# Patient Record
Sex: FEMALE | Race: BLACK OR AFRICAN AMERICAN | Employment: UNEMPLOYED | URBAN - METROPOLITAN AREA
[De-identification: names, ages, dates, MRNs, and addresses within clinical notes are randomized per-mention and may not be internally consistent; named-entity substitution may affect disease eponyms.]

---

## 2017-01-09 ENCOUNTER — APPOINTMENT (OUTPATIENT)
Dept: ULTRASOUND IMAGING | Age: 22
End: 2017-01-09
Attending: PHYSICIAN ASSISTANT
Payer: MEDICAID

## 2017-01-09 ENCOUNTER — HOSPITAL ENCOUNTER (EMERGENCY)
Age: 22
Discharge: HOME | End: 2017-01-10
Attending: EMERGENCY MEDICINE
Payer: MEDICAID

## 2017-01-09 DIAGNOSIS — N30.00 ACUTE CYSTITIS WITHOUT HEMATURIA: ICD-10-CM

## 2017-01-09 DIAGNOSIS — O21.9 VOMITING DURING PREGNANCY: Primary | ICD-10-CM

## 2017-01-09 DIAGNOSIS — K80.20 GALLSTONES: ICD-10-CM

## 2017-01-09 DIAGNOSIS — E86.0 DEHYDRATION: ICD-10-CM

## 2017-01-09 LAB
ALBUMIN SERPL BCP-MCNC: 3.6 G/DL (ref 3.5–5)
ALBUMIN/GLOB SERPL: 0.9 {RATIO} (ref 1.1–2.2)
ALP SERPL-CCNC: 62 U/L (ref 45–117)
ALT SERPL-CCNC: 14 U/L (ref 12–78)
ANION GAP BLD CALC-SCNC: 9 MMOL/L (ref 5–15)
APPEARANCE UR: ABNORMAL
AST SERPL W P-5'-P-CCNC: 8 U/L (ref 15–37)
BACTERIA URNS QL MICRO: ABNORMAL /HPF
BASOPHILS # BLD AUTO: 0 K/UL (ref 0–0.1)
BASOPHILS # BLD: 0 % (ref 0–1)
BILIRUB SERPL-MCNC: 0.4 MG/DL (ref 0.2–1)
BILIRUB UR QL CFM: NEGATIVE
BUN SERPL-MCNC: 6 MG/DL (ref 6–20)
BUN/CREAT SERPL: 7 (ref 12–20)
CALCIUM SERPL-MCNC: 9.4 MG/DL (ref 8.5–10.1)
CHLORIDE SERPL-SCNC: 100 MMOL/L (ref 97–108)
CO2 SERPL-SCNC: 28 MMOL/L (ref 21–32)
COLOR UR: ABNORMAL
CREAT SERPL-MCNC: 0.88 MG/DL (ref 0.55–1.02)
EOSINOPHIL # BLD: 0.1 K/UL (ref 0–0.4)
EOSINOPHIL NFR BLD: 1 % (ref 0–7)
EPITH CASTS URNS QL MICRO: ABNORMAL /LPF
ERYTHROCYTE [DISTWIDTH] IN BLOOD BY AUTOMATED COUNT: 13.8 % (ref 11.5–14.5)
GLOBULIN SER CALC-MCNC: 4.1 G/DL (ref 2–4)
GLUCOSE SERPL-MCNC: 79 MG/DL (ref 65–100)
GLUCOSE UR STRIP.AUTO-MCNC: NEGATIVE MG/DL
HCT VFR BLD AUTO: 39.4 % (ref 35–47)
HGB BLD-MCNC: 13.4 G/DL (ref 11.5–16)
HGB UR QL STRIP: NEGATIVE
KETONES UR QL STRIP.AUTO: >80 MG/DL
LACTATE SERPL-SCNC: 1.1 MMOL/L (ref 0.4–2)
LEUKOCYTE ESTERASE UR QL STRIP.AUTO: ABNORMAL
LIPASE SERPL-CCNC: 145 U/L (ref 73–393)
LYMPHOCYTES # BLD AUTO: 18 % (ref 12–49)
LYMPHOCYTES # BLD: 2.2 K/UL (ref 0.8–3.5)
MCH RBC QN AUTO: 32.6 PG (ref 26–34)
MCHC RBC AUTO-ENTMCNC: 34 G/DL (ref 30–36.5)
MCV RBC AUTO: 95.9 FL (ref 80–99)
MONOCYTES # BLD: 0.7 K/UL (ref 0–1)
MONOCYTES NFR BLD AUTO: 6 % (ref 5–13)
MUCOUS THREADS URNS QL MICRO: ABNORMAL /LPF
NEUTS SEG # BLD: 9.5 K/UL (ref 1.8–8)
NEUTS SEG NFR BLD AUTO: 75 % (ref 32–75)
NITRITE UR QL STRIP.AUTO: NEGATIVE
PH UR STRIP: 7.5 [PH] (ref 5–8)
PLATELET # BLD AUTO: 266 K/UL (ref 150–400)
POTASSIUM SERPL-SCNC: 3.7 MMOL/L (ref 3.5–5.1)
PROT SERPL-MCNC: 7.7 G/DL (ref 6.4–8.2)
PROT UR STRIP-MCNC: 100 MG/DL
RBC # BLD AUTO: 4.11 M/UL (ref 3.8–5.2)
RBC #/AREA URNS HPF: ABNORMAL /HPF (ref 0–5)
SODIUM SERPL-SCNC: 137 MMOL/L (ref 136–145)
SP GR UR REFRACTOMETRY: >1.03 (ref 1–1.03)
UA: UC IF INDICATED,UAUC: ABNORMAL
UROBILINOGEN UR QL STRIP.AUTO: 1 EU/DL (ref 0.2–1)
WBC # BLD AUTO: 12.6 K/UL (ref 3.6–11)
WBC URNS QL MICRO: ABNORMAL /HPF (ref 0–4)

## 2017-01-09 PROCEDURE — 74011000258 HC RX REV CODE- 258: Performed by: PHYSICIAN ASSISTANT

## 2017-01-09 PROCEDURE — 96365 THER/PROPH/DIAG IV INF INIT: CPT

## 2017-01-09 PROCEDURE — 83690 ASSAY OF LIPASE: CPT | Performed by: PHYSICIAN ASSISTANT

## 2017-01-09 PROCEDURE — 81001 URINALYSIS AUTO W/SCOPE: CPT | Performed by: EMERGENCY MEDICINE

## 2017-01-09 PROCEDURE — 87077 CULTURE AEROBIC IDENTIFY: CPT | Performed by: EMERGENCY MEDICINE

## 2017-01-09 PROCEDURE — 96361 HYDRATE IV INFUSION ADD-ON: CPT

## 2017-01-09 PROCEDURE — 96375 TX/PRO/DX INJ NEW DRUG ADDON: CPT

## 2017-01-09 PROCEDURE — 74011250636 HC RX REV CODE- 250/636: Performed by: PHYSICIAN ASSISTANT

## 2017-01-09 PROCEDURE — 83605 ASSAY OF LACTIC ACID: CPT | Performed by: PHYSICIAN ASSISTANT

## 2017-01-09 PROCEDURE — 87086 URINE CULTURE/COLONY COUNT: CPT | Performed by: EMERGENCY MEDICINE

## 2017-01-09 PROCEDURE — 99284 EMERGENCY DEPT VISIT MOD MDM: CPT

## 2017-01-09 PROCEDURE — 36415 COLL VENOUS BLD VENIPUNCTURE: CPT | Performed by: EMERGENCY MEDICINE

## 2017-01-09 PROCEDURE — 80053 COMPREHEN METABOLIC PANEL: CPT | Performed by: EMERGENCY MEDICINE

## 2017-01-09 PROCEDURE — 76705 ECHO EXAM OF ABDOMEN: CPT

## 2017-01-09 PROCEDURE — 87186 SC STD MICRODIL/AGAR DIL: CPT | Performed by: EMERGENCY MEDICINE

## 2017-01-09 PROCEDURE — 85025 COMPLETE CBC W/AUTO DIFF WBC: CPT | Performed by: EMERGENCY MEDICINE

## 2017-01-09 RX ORDER — ACETAMINOPHEN 500 MG
1000 TABLET ORAL ONCE
Status: DISCONTINUED | OUTPATIENT
Start: 2017-01-09 | End: 2017-01-09

## 2017-01-09 RX ORDER — ONDANSETRON 2 MG/ML
4 INJECTION INTRAMUSCULAR; INTRAVENOUS
Status: COMPLETED | OUTPATIENT
Start: 2017-01-09 | End: 2017-01-09

## 2017-01-09 RX ORDER — CEPHALEXIN 500 MG/1
500 CAPSULE ORAL 3 TIMES DAILY
Qty: 21 CAP | Refills: 0 | Status: SHIPPED | OUTPATIENT
Start: 2017-01-09 | End: 2017-01-12

## 2017-01-09 RX ORDER — ONDANSETRON 4 MG/1
4 TABLET, ORALLY DISINTEGRATING ORAL
Qty: 15 TAB | Refills: 0 | Status: SHIPPED | OUTPATIENT
Start: 2017-01-09 | End: 2017-01-29

## 2017-01-09 RX ADMIN — CEFTRIAXONE 1 G: 1 INJECTION, POWDER, FOR SOLUTION INTRAMUSCULAR; INTRAVENOUS at 21:44

## 2017-01-09 RX ADMIN — SODIUM CHLORIDE 2000 ML: 900 INJECTION, SOLUTION INTRAVENOUS at 21:44

## 2017-01-09 RX ADMIN — ONDANSETRON 4 MG: 2 INJECTION INTRAMUSCULAR; INTRAVENOUS at 21:44

## 2017-01-10 VITALS
HEIGHT: 65 IN | OXYGEN SATURATION: 100 % | WEIGHT: 160.5 LBS | TEMPERATURE: 98.8 F | DIASTOLIC BLOOD PRESSURE: 77 MMHG | RESPIRATION RATE: 16 BRPM | HEART RATE: 110 BPM | BODY MASS INDEX: 26.74 KG/M2 | SYSTOLIC BLOOD PRESSURE: 113 MMHG

## 2017-01-10 NOTE — ED NOTES
Assumed care of pt from triage at this time. Pt arrives with c/o R flank pain intermittently x2 months. Pt states pain worsened today with persistent N/V that began last night. Pt denies any diarrhea-LBM this am, pt describes as \"normal, formed\" stool. Pt has not tolerated PO fluids, solids today. Pt denies any urinary symptoms. Pt states she has been seen for similar symptoms in the past.      Pt resting comfortably on the stretcher in a position of comfort.  Pt in no acute distress at this time.  Call bell within reach.  Side rails x 2.  Cardiac monitor x 2.  Stretcher locked in the lowest position.  Pt aware of plan to await for MD/PA-C/NP assessment, and pt/family verbalizes understanding.  Will continue to monitor abdominal pain.

## 2017-01-10 NOTE — ED PROVIDER NOTES
HPI Comments: Sharla Huntley is a 19 y.o. A0 female who is 16 weeks pregnant (determined from previous [address]) presents ambulatory to the ED c/o constant 7/10 RUQ abdominal pain since this AM.  She also c/o nausea and vomiting for 3 days, noting that N/V has been an ongoing issue since the start of her pregnancy. Pt states she has been seen multiple times in ED and admitted once for same symptoms during current pregnancy, and has been prescribed multiple antiemetics for her nausea but only took one of the antiemetics once today without any relief in nausea. She denies taking any medications for pain. Per medical records, pt was first seen in ED on 16 for nausea and vomiting and was discharged home with Zofran after being found pregnant. Pt states she followed up with an OBGYN who [place] [facility]  Records show that pt was seen in ED again on  and admitted to hospital until  for intractable nausea and vomiting with acute pancreatitis. She was discharged from hospital after symptomatic improvement, and prescribed home with Zofran and Reglan. Pt was see again in ED on  and  for N/V and hyperemesis gravidarum, and discharged home with Zofran ODT and phenergan. Per pt, last follow-up with OBGYN was in December, but pt is unsure of her gestational age. Of note, pt states she is not tolerating any PO intake or PO medications, including her prenatal vitamins. She notes her last BM was yesterday. She specifically denies fevers, chills, diarrhea, hematemesis, or vaginal discharge. PCP: None    Patient is a poor historian. The history is provided by the patient. Past Medical History:   Diagnosis Date    Congenital heart defect        History reviewed. No pertinent past surgical history.       Family History:   Problem Relation Age of Onset    No Known Problems Mother     No Known Problems Father     No Known Problems Sister     No Known Problems Brother  Diabetes Maternal Grandmother        Social History     Social History    Marital status: SINGLE     Spouse name: N/A    Number of children: N/A    Years of education: N/A     Occupational History    Not on file. Social History Main Topics    Smoking status: Former Smoker     Quit date: 11/16/2015    Smokeless tobacco: Not on file    Alcohol use No    Drug use: No    Sexual activity: Yes     Partners: Male     Other Topics Concern     Service No    Sleep Concern No    Weight Concern No    Seat Belt Yes     Social History Narrative         ALLERGIES: Review of patient's allergies indicates no known allergies. Review of Systems   Constitutional: Negative. Negative for activity change, chills, fatigue and unexpected weight change. Respiratory: Negative for cough, chest tightness, shortness of breath and wheezing. Cardiovascular: Negative. Negative for chest pain and palpitations. Gastrointestinal: Positive for abdominal pain (RUQ), nausea and vomiting. Negative for diarrhea. Denies hematemesis   Genitourinary: Negative for dysuria, flank pain, frequency, hematuria and vaginal discharge. Musculoskeletal: Negative. Negative for arthralgias, back pain, neck pain and neck stiffness. Skin: Negative. Negative for color change and rash. Neurological: Negative. Negative for dizziness, numbness and headaches. Psychiatric/Behavioral: Negative. Negative for confusion. All other systems reviewed and are negative. Vitals:    01/09/17 1808 01/09/17 1856 01/09/17 2023 01/09/17 2215   BP: 121/70 (!) 112/93 100/70 116/73   Pulse: (!) 110 (!) 127 (!) 110    Resp: 18 18 16    Temp: 98.8 °F (37.1 °C)      SpO2: 100% 98% 100% 98%   Weight: 72.8 kg (160 lb 7.9 oz)      Height: 5' 5\" (1.651 m)               Physical Exam   Constitutional: She is oriented to person, place, and time. She appears well-developed and well-nourished. She is active. Non-toxic appearance.  No distress. HENT:   Head: Normocephalic and atraumatic. Eyes: Conjunctivae are normal. Pupils are equal, round, and reactive to light. Right eye exhibits no discharge. Left eye exhibits no discharge. Neck: Normal range of motion and full passive range of motion without pain. Neck supple. No tracheal tenderness present. Cardiovascular: Normal rate, regular rhythm, normal heart sounds, intact distal pulses and normal pulses. Exam reveals no gallop and no friction rub. No murmur heard. Pulmonary/Chest: Effort normal and breath sounds normal. No respiratory distress. She has no wheezes. She has no rales. She exhibits no tenderness. Abdominal: Soft. Normal appearance and bowel sounds are normal. She exhibits no distension. There is tenderness (mild mid R quadrant TTP). There is no rebound, no guarding and no CVA tenderness. Musculoskeletal: Normal range of motion. She exhibits no edema or tenderness. Neurological: She is alert and oriented to person, place, and time. She has normal strength. No cranial nerve deficit or sensory deficit. Coordination normal.   Skin: Skin is warm, dry and intact. No abrasion and no rash noted. She is not diaphoretic. No erythema. Psychiatric: She has a normal mood and affect. Her speech is normal and behavior is normal. Cognition and memory are normal.   Nursing note and vitals reviewed. MDM  Number of Diagnoses or Management Options  Diagnosis management comments: DDx: UTI, pyelonephritis, cholecystitis, pancreatitis, dehydration, electrolyte abnormality, hyperemesis gravidarum       Amount and/or Complexity of Data Reviewed  Clinical lab tests: ordered and reviewed  Tests in the radiology section of CPT®: ordered and reviewed  Review and summarize past medical records: yes    Patient Progress  Patient progress: resolved    ED Course       Procedures    PROGRESS NOTE:  11:23 PM  Pt re-evaluated.   Reports her pain has resolved and does not want any pain medications, acetaminophen had been ordered. Nausea is resolved and she is drinking water, on her 2nd bag of normal saline. No longer tachycardic, states she feels better. Pt now states she took 1 Reglan pill yesterday, and took 1 phenergan suppository today, but is out of her Zofran ODT. Discussed to take her anti-emetics and to take more than one if needed for nausea. Written by Savanah Gonzalez ED Scribe, as dictated by Tho Veliz. 11:25 PM  I reviewed our electronic medical record system for any past medical records that were available that may contribute to the patients current condition. Most recent urine cx grew e.coli sensitive to cephalosporins. Rocephin given in ED and Keflex at discharge. Current cx sent. Per nurse fetal heart tones were done in triage and were found to be 160. Sherwin Mirza PA-C        LABORATORY TESTS:  Recent Results (from the past 12 hour(s))   CBC WITH AUTOMATED DIFF    Collection Time: 01/09/17  7:00 PM   Result Value Ref Range    WBC 12.6 (H) 3.6 - 11.0 K/uL    RBC 4.11 3.80 - 5.20 M/uL    HGB 13.4 11.5 - 16.0 g/dL    HCT 39.4 35.0 - 47.0 %    MCV 95.9 80.0 - 99.0 FL    MCH 32.6 26.0 - 34.0 PG    MCHC 34.0 30.0 - 36.5 g/dL    RDW 13.8 11.5 - 14.5 %    PLATELET 875 792 - 742 K/uL    NEUTROPHILS 75 32 - 75 %    LYMPHOCYTES 18 12 - 49 %    MONOCYTES 6 5 - 13 %    EOSINOPHILS 1 0 - 7 %    BASOPHILS 0 0 - 1 %    ABS. NEUTROPHILS 9.5 (H) 1.8 - 8.0 K/UL    ABS. LYMPHOCYTES 2.2 0.8 - 3.5 K/UL    ABS. MONOCYTES 0.7 0.0 - 1.0 K/UL    ABS. EOSINOPHILS 0.1 0.0 - 0.4 K/UL    ABS.  BASOPHILS 0.0 0.0 - 0.1 K/UL   METABOLIC PANEL, COMPREHENSIVE    Collection Time: 01/09/17  7:00 PM   Result Value Ref Range    Sodium 137 136 - 145 mmol/L    Potassium 3.7 3.5 - 5.1 mmol/L    Chloride 100 97 - 108 mmol/L    CO2 28 21 - 32 mmol/L    Anion gap 9 5 - 15 mmol/L    Glucose 79 65 - 100 mg/dL    BUN 6 6 - 20 MG/DL    Creatinine 0.88 0.55 - 1.02 MG/DL    BUN/Creatinine ratio 7 (L) 12 - 20 GFR est AA >60 >60 ml/min/1.73m2    GFR est non-AA >60 >60 ml/min/1.73m2    Calcium 9.4 8.5 - 10.1 MG/DL    Bilirubin, total 0.4 0.2 - 1.0 MG/DL    ALT 14 12 - 78 U/L    AST 8 (L) 15 - 37 U/L    Alk. phosphatase 62 45 - 117 U/L    Protein, total 7.7 6.4 - 8.2 g/dL    Albumin 3.6 3.5 - 5.0 g/dL    Globulin 4.1 (H) 2.0 - 4.0 g/dL    A-G Ratio 0.9 (L) 1.1 - 2.2     LIPASE    Collection Time: 01/09/17  7:00 PM   Result Value Ref Range    Lipase 145 73 - 393 U/L   URINALYSIS W/ REFLEX CULTURE    Collection Time: 01/09/17  8:29 PM   Result Value Ref Range    Color DARK YELLOW      Appearance CLOUDY (A) CLEAR      Specific gravity >1.030 (H) 1.003 - 1.030    pH (UA) 7.5 5.0 - 8.0      Protein 100 (A) NEG mg/dL    Glucose NEGATIVE  NEG mg/dL    Ketone >80 (A) NEG mg/dL    Blood NEGATIVE  NEG      Urobilinogen 1.0 0.2 - 1.0 EU/dL    Nitrites NEGATIVE  NEG      Leukocyte Esterase SMALL (A) NEG      WBC 0-4 0 - 4 /hpf    RBC 0-5 0 - 5 /hpf    Epithelial cells MANY (A) FEW /lpf    Bacteria 3+ (A) NEG /hpf    UA:UC IF INDICATED URINE CULTURE ORDERED (A) CNI      Mucus 2+ (A) NEG /lpf   BILIRUBIN, CONFIRM    Collection Time: 01/09/17  8:29 PM   Result Value Ref Range    Bilirubin UA, confirm NEGATIVE  NEG     LACTIC ACID, PLASMA    Collection Time: 01/09/17  9:35 PM   Result Value Ref Range    Lactic acid 1.1 0.4 - 2.0 MMOL/L       IMAGING RESULTS:  US ABD LTD   Final Result   INDICATION: ruq pain today; eval gallbladder, pancreas, eval right kidney for  hydro      EXAM: Limited right upper quadrant abdominal ultrasound. Comparison November 25, 2016.      FINDINGS: There are no focal hepatic lesions. No intra or extrahepatic biliary  ductal dilatation. Common duct measures 3 mm. There are tiny mobile stones  within the gallbladder. No wall thickening. Pancreatic head is not enlarged. The  right kidney measures 10.1cm. No stones or hydronephrosis.  Portal vein is patent  with appropriate direction of flow.     IMPRESSION  IMPRESSION:  1. Small gallstones. No ductal dilatation          MEDICATIONS GIVEN:  Medications   sodium chloride 0.9 % bolus infusion 2,000 mL (2,000 mL IntraVENous New Bag 1/9/17 2144)   ondansetron (ZOFRAN) injection 4 mg (4 mg IntraVENous Given 1/9/17 2144)   cefTRIAXone (ROCEPHIN) 1 g in 0.9% sodium chloride (MBP/ADV) 50 mL (0 g IntraVENous IV Completed 1/9/17 2214)       IMPRESSION:  1. Vomiting during pregnancy    2. Dehydration    3. Acute cystitis without hematuria    4. Gallstones        PLAN:  1. Discharge home  Current Discharge Medication List      START taking these medications    Details   !! ondansetron (ZOFRAN ODT) 4 mg disintegrating tablet Take 1 Tab by mouth every eight (8) hours as needed for Nausea. Qty: 15 Tab, Refills: 0       !! - Potential duplicate medications found. Please discuss with provider. CONTINUE these medications which have NOT CHANGED    Details   !! ondansetron (ZOFRAN ODT) 4 mg disintegrating tablet Take 1 Tab by mouth every eight (8) hours as needed for Nausea for up to 10 doses. Qty: 10 Tab, Refills: 0      promethazine (PHENERGAN) 25 mg tablet Take 1 Tab by mouth every six (6) hours as needed for Nausea for up to 12 doses. Qty: 12 Tab, Refills: 0      prenatal multivit-ca-min-fe-fa tab Take 1 UNSPECIFIED by mouth daily. Indications: Pregnancy      !! ondansetron (ZOFRAN ODT) 4 mg disintegrating tablet Take 1 Tab by mouth every eight (8) hours as needed for Nausea. Qty: 16 Tab, Refills: 0      metoclopramide HCl (REGLAN) 5 mg tablet Take 1 Tab by mouth every eight (8) hours as needed. For nausea  Qty: 30 Tab, Refills: 0       !! - Potential duplicate medications found. Please discuss with provider. 2.   Follow-up Information     Follow up With Details Comments Contact Info    Your OB at [facility] Schedule an appointment as soon as possible for a visit in 1-2 days for re-check. Continue phenergan suppositories, Reglan, Zofran as needed.      [facility] EMERGENCY DEPT  If symptoms worsen or [facility] emergency room. [address] [place] [address]  [phone]      Return to ED if worse     DISCHARGE NOTE  11:38 PM  The patient has been re-evaluated and is ready for discharge. Reviewed available results with patient. Counseled pt on diagnosis and care plan. Pt has expressed understanding, and all questions have been answered. Pt agrees with plan and agrees to F/U as recommended, or return to the ED if their sxs worsen. Discharge instructions have been provided and explained to the pt, along with reasons to return to the ED. This note is prepared by Lisa Glaser, acting as Scribe for [employer]. DEONTE Guillory: The scribe's documentation has been prepared under my direction and personally reviewed by me in its entirety. I confirm that the note above accurately reflects all work, treatment, procedures, and medical decision making performed by me.

## 2017-01-10 NOTE — DISCHARGE INSTRUCTIONS
Dehydration: Care Instructions  Your Care Instructions  Dehydration happens when your body loses too much fluid. This might happen when you do not drink enough water or you lose large amounts of fluids from your body because of diarrhea, vomiting, or sweating. Severe dehydration can be life-threatening. Water and minerals called electrolytes help put your body fluids back in balance. Learn the early signs of fluid loss, and drink more fluids to prevent dehydration. Follow-up care is a key part of your treatment and safety. Be sure to make and go to all appointments, and call your doctor if you are having problems. It's also a good idea to know your test results and keep a list of the medicines you take. How can you care for yourself at home? · To prevent dehydration, drink plenty of fluids, enough so that your urine is light yellow or clear like water. Choose water and other caffeine-free clear liquids until you feel better. If you have kidney, heart, or liver disease and have to limit fluids, talk with your doctor before you increase the amount of fluids you drink. · If you do not feel like eating or drinking, try taking small sips of water, sports drinks, or other rehydration drinks. · Get plenty of rest.  To prevent dehydration  · Add more fluids to your diet and daily routine, unless your doctor has told you not to. · During hot weather, drink more fluids. Drink even more fluids if you exercise a lot. Stay away from drinks with alcohol or caffeine. · Watch for the symptoms of dehydration. These include:  ¨ A dry, sticky mouth. ¨ Dark yellow urine, and not much of it. ¨ Dry and sunken eyes. ¨ Feeling very tired. · Learn what problems can lead to dehydration. These include:  ¨ Diarrhea, fever, and vomiting. ¨ Any illness with a fever, such as pneumonia or the flu. ¨ Activities that cause heavy sweating, such as endurance races and heavy outdoor work in hot or humid weather.   ¨ Alcohol or drug abuse or withdrawal.  ¨ Certain medicines, such as cold and allergy pills (antihistamines), diet pills (diuretics), and laxatives. ¨ Certain diseases, such as diabetes, cancer, and heart or kidney disease. When should you call for help? Call 911 anytime you think you may need emergency care. For example, call if:  · You passed out (lost consciousness). Call your doctor now or seek immediate medical care if:  · You are confused and cannot think clearly. · You are dizzy or lightheaded, or you feel like you may faint. · You have signs of needing more fluids. You have sunken eyes and a dry mouth, and you pass only a little dark urine. · You cannot keep fluids down. Watch closely for changes in your health, and be sure to contact your doctor if:  · You are not making tears. · Your skin is very dry and sags slowly back into place after you pinch it. · Your mouth and eyes are very dry. Where can you learn more? Go to http://gemma-humberto.info/. Enter T025 in the search box to learn more about \"Dehydration: Care Instructions. \"  Current as of: May 27, 2016  Content Version: 11.1  © 4108-5340 [employer]. Care instructions adapted under license by [employer] (which disclaims liability or warranty for this information). If you have questions about a medical condition or this instruction, always ask your healthcare professional. [place] [address] any warranty or liability for your use of this information. Oral Rehydration: Care Instructions  Your Care Instructions  Dehydration occurs when your body loses too much water. This can happen if you do not drink enough fluids or lose a lot of fluid due to diarrhea, vomiting, or sweating. Being dehydrated can cause health problems and can even be life-threatening. To replace lost fluids, you need to drink liquid that contains special chemicals called electrolytes. Electrolytes keep your body working well. Plain water does not have electrolytes. You also need to rest to prevent more fluid loss. Replacing water and electrolytes (oral rehydration) completely takes about 36 hours. But you should feel better within a few hours. Follow-up care is a key part of your treatment and safety. Be sure to make and go to all appointments, and call your doctor if you are having problems. It's also a good idea to know your test results and keep a list of the medicines you take. How can you care for yourself at home? · Take frequent sips of a drink such as Gatorade, Powerade, or other rehydration drinks that your doctor suggests. These replace both fluid and important chemicals (electrolytes) you need for balance in your blood. · Drink 2 quarts of cool liquid over 2 to 4 hours. You should have at least 10 glasses of liquid a day to replace lost fluid. If you have kidney, heart, or liver disease and have to limit fluids, talk with your doctor before you increase the amount of fluids you drink. · Make your own drink. Measure everything carefully. The drink may not work well or may even be harmful if the amounts are off. ¨ 1 quart water  ¨ ½ teaspoon salt  ¨ 6 teaspoons sugar  · Do not drink liquid with caffeine, such as coffee and ike. · Do not drink any alcohol. It can make you dehydrated. · Drink plenty of fluids, enough so that your urine is light yellow or clear like water. If you have kidney, heart, or liver disease and have to limit fluids, talk with your doctor before you increase the amount of fluids you drink. When should you call for help? Call 911 anytime you think you may need emergency care. For example, call if:  · You have signs of severe dehydration, such as:  ¨ You are confused or unable to stay awake. ¨ You passed out (lost consciousness). Call your doctor now or seek immediate medical care if:  · You still have signs of dehydration.  You have sunken eyes and a dry mouth, and you pass only a little dark urine.  · You are dizzy or lightheaded, or you feel like you may faint. · You are not able to keep down fluids. Watch closely for changes in your health, and be sure to contact your doctor if:  · You do not get better as expected. Where can you learn more? Go to http://gemma-humberto.info/. Enter I040 in the search box to learn more about \"Oral Rehydration: Care Instructions. \"  Current as of: May 27, 2016  Content Version: 11.1  © 3631-4016 [employer]. Care instructions adapted under license by [employer] (which disclaims liability or warranty for this information). If you have questions about a medical condition or this instruction, always ask your healthcare professional. [address] any warranty or liability for your use of this information. Urinary Tract Infection in Pregnancy: Care Instructions  Your Care Instructions    A urinary tract infection, or UTI, is an infection of the bladder and other urinary structures. Most UTIs occur in the bladder. They often cause pain or burning when you urinate. UTI is the most common bacterial infection in pregnancy. If untreated, a UTI could lead to problems such as a kidney infection or  labor. Most UTIs can be cured with antibiotics. Your doctor will prescribe an antibiotic that is safe during pregnancy. Be sure to finish your medicine so that the infection does not spread to your kidneys. Follow-up care is a key part of your treatment and safety. Be sure to make and go to all appointments, and call your doctor if you are having problems. It's also a good idea to know your test results and keep a list of the medicines you take. How can you care for yourself at home? · Take your antibiotics as directed. Do not stop taking them just because you feel better. You need to take the full course of antibiotics. · Drink extra water and other fluids for the next day or two.  This will help wash out the bacteria causing the infection. If you have kidney, heart, or liver disease and have to limit fluids, talk with your doctor before you increase the amount of fluids you drink. · Drink cranberry juice to help fight bacteria in the bladder. · Do not drink alcohol. · Urinate often. Try to empty your bladder each time. Preventing UTIs  · Drink plenty of fluids, enough so that your urine is light yellow or clear like water. This helps you urinate often, which clears bacteria from your system. If you have kidney, heart, or liver disease and have to limit fluids, talk with your doctor before you increase the amount of fluids you drink. · Drink cranberry juice. · Urinate when you first have the urge. · Urinate right after you have sex. This is the best way for women to avoid UTIs. · When going to the bathroom, wipe from front to back to keep bacteria from entering the vagina or urethra. When should you call for help? Call your doctor now or seek immediate medical care if:  · Symptoms such as fever, chills, nausea, or vomiting get worse or appear for the first time. · You have new pain in your back just below your rib cage. This is called flank pain. · There is new blood or pus in your urine. · You have any problems with your antibiotic medicine. Watch closely for changes in your health, and be sure to contact your doctor if:  · You are not getting better after 1 day (24 hours). · You have new symptoms, such as blood in your urine. Where can you learn more? Go to http://gemma-humberto.info/. Enter M982 in the search box to learn more about \"Urinary Tract Infection in Pregnancy: Care Instructions. \"  Current as of: June 8, 2016  Content Version: 11.1  © 8654-2500 [employer]. Care instructions adapted under license by [employer] (which disclaims liability or warranty for this information).  If you have questions about a medical condition or this instruction, always ask your healthcare professional. [place] [address] any warranty or liability for your use of this information.

## 2017-01-10 NOTE — ED NOTES
Discharge instructions given to patient by MARLENY Cooper. Verbalized understanding of instructions. Patient discharged without difficulty. Patient discharged in stable condition via ambulation accompanied by family.

## 2017-01-12 LAB
BACTERIA SPEC CULT: NORMAL
CC UR VC: NORMAL
SERVICE CMNT-IMP: NORMAL

## 2017-01-12 RX ORDER — AMOXICILLIN AND CLAVULANATE POTASSIUM 875; 125 MG/1; MG/1
1 TABLET, FILM COATED ORAL 2 TIMES DAILY
Qty: 10 TAB | Refills: 0 | Status: SHIPPED | OUTPATIENT
Start: 2017-01-12 | End: 2017-01-17

## 2017-01-12 NOTE — PROGRESS NOTES
Pt called back and discussed her urine culture results with her. Pt states she is feeling better and that she is currently asymptomatic. Pt has no further questions. Sent script to preferred pharmacy.   Marcela Montalvo PA-C

## 2017-01-12 NOTE — PROGRESS NOTES
Attempted to call pt at number provided. Pt was unable to answer phone so I left a VM without revealing protected health information. Asked pt to call PA phone back to discuss results. Pt will need Augmentin or Amoxil course of ABX per Bessie Snowden, PharmD.    JUAN JOSE AyalaC

## 2017-01-29 ENCOUNTER — HOSPITAL ENCOUNTER (EMERGENCY)
Age: 22
Discharge: HOME | End: 2017-01-29
Attending: EMERGENCY MEDICINE
Payer: MEDICAID

## 2017-01-29 VITALS
DIASTOLIC BLOOD PRESSURE: 73 MMHG | HEIGHT: 65 IN | SYSTOLIC BLOOD PRESSURE: 116 MMHG | BODY MASS INDEX: 27.14 KG/M2 | OXYGEN SATURATION: 100 % | TEMPERATURE: 98.2 F | RESPIRATION RATE: 16 BRPM | HEART RATE: 92 BPM | WEIGHT: 162.92 LBS

## 2017-01-29 DIAGNOSIS — N30.00 ACUTE CYSTITIS WITHOUT HEMATURIA: ICD-10-CM

## 2017-01-29 DIAGNOSIS — O21.0 MORNING SICKNESS: Primary | ICD-10-CM

## 2017-01-29 LAB
ALBUMIN SERPL BCP-MCNC: 3.4 G/DL (ref 3.5–5)
ALBUMIN/GLOB SERPL: 0.9 {RATIO} (ref 1.1–2.2)
ALP SERPL-CCNC: 65 U/L (ref 45–117)
ALT SERPL-CCNC: 15 U/L (ref 12–78)
ANION GAP BLD CALC-SCNC: 11 MMOL/L (ref 5–15)
APPEARANCE UR: ABNORMAL
AST SERPL W P-5'-P-CCNC: 11 U/L (ref 15–37)
BACTERIA URNS QL MICRO: ABNORMAL /HPF
BASOPHILS # BLD AUTO: 0 K/UL (ref 0–0.1)
BASOPHILS # BLD: 0 % (ref 0–1)
BILIRUB SERPL-MCNC: 0.4 MG/DL (ref 0.2–1)
BILIRUB UR QL CFM: NEGATIVE
BUN SERPL-MCNC: 6 MG/DL (ref 6–20)
BUN/CREAT SERPL: 7 (ref 12–20)
CALCIUM SERPL-MCNC: 9.4 MG/DL (ref 8.5–10.1)
CHLORIDE SERPL-SCNC: 103 MMOL/L (ref 97–108)
CO2 SERPL-SCNC: 27 MMOL/L (ref 21–32)
COLOR UR: ABNORMAL
CREAT SERPL-MCNC: 0.84 MG/DL (ref 0.55–1.02)
EOSINOPHIL # BLD: 0.2 K/UL (ref 0–0.4)
EOSINOPHIL NFR BLD: 2 % (ref 0–7)
EPITH CASTS URNS QL MICRO: ABNORMAL /LPF
ERYTHROCYTE [DISTWIDTH] IN BLOOD BY AUTOMATED COUNT: 13.4 % (ref 11.5–14.5)
GLOBULIN SER CALC-MCNC: 4 G/DL (ref 2–4)
GLUCOSE SERPL-MCNC: 77 MG/DL (ref 65–100)
GLUCOSE UR STRIP.AUTO-MCNC: NEGATIVE MG/DL
HCT VFR BLD AUTO: 38.7 % (ref 35–47)
HGB BLD-MCNC: 13 G/DL (ref 11.5–16)
HGB UR QL STRIP: NEGATIVE
KETONES UR QL STRIP.AUTO: ABNORMAL MG/DL
LEUKOCYTE ESTERASE UR QL STRIP.AUTO: ABNORMAL
LYMPHOCYTES # BLD AUTO: 20 % (ref 12–49)
LYMPHOCYTES # BLD: 1.9 K/UL (ref 0.8–3.5)
MCH RBC QN AUTO: 33 PG (ref 26–34)
MCHC RBC AUTO-ENTMCNC: 33.6 G/DL (ref 30–36.5)
MCV RBC AUTO: 98.2 FL (ref 80–99)
MONOCYTES # BLD: 0.8 K/UL (ref 0–1)
MONOCYTES NFR BLD AUTO: 8 % (ref 5–13)
NEUTS SEG # BLD: 6.7 K/UL (ref 1.8–8)
NEUTS SEG NFR BLD AUTO: 70 % (ref 32–75)
NITRITE UR QL STRIP.AUTO: NEGATIVE
PH UR STRIP: 7 [PH] (ref 5–8)
PLATELET # BLD AUTO: 251 K/UL (ref 150–400)
POTASSIUM SERPL-SCNC: 3.5 MMOL/L (ref 3.5–5.1)
PROT SERPL-MCNC: 7.4 G/DL (ref 6.4–8.2)
PROT UR STRIP-MCNC: 100 MG/DL
RBC # BLD AUTO: 3.94 M/UL (ref 3.8–5.2)
RBC #/AREA URNS HPF: ABNORMAL /HPF (ref 0–5)
SODIUM SERPL-SCNC: 141 MMOL/L (ref 136–145)
SP GR UR REFRACTOMETRY: 1.02 (ref 1–1.03)
UA: UC IF INDICATED,UAUC: ABNORMAL
UROBILINOGEN UR QL STRIP.AUTO: 1 EU/DL (ref 0.2–1)
WBC # BLD AUTO: 9.6 K/UL (ref 3.6–11)
WBC URNS QL MICRO: ABNORMAL /HPF (ref 0–4)

## 2017-01-29 PROCEDURE — 36415 COLL VENOUS BLD VENIPUNCTURE: CPT | Performed by: EMERGENCY MEDICINE

## 2017-01-29 PROCEDURE — 80053 COMPREHEN METABOLIC PANEL: CPT | Performed by: EMERGENCY MEDICINE

## 2017-01-29 PROCEDURE — 96374 THER/PROPH/DIAG INJ IV PUSH: CPT

## 2017-01-29 PROCEDURE — 74011250637 HC RX REV CODE- 250/637: Performed by: EMERGENCY MEDICINE

## 2017-01-29 PROCEDURE — 74011250636 HC RX REV CODE- 250/636: Performed by: EMERGENCY MEDICINE

## 2017-01-29 PROCEDURE — 99283 EMERGENCY DEPT VISIT LOW MDM: CPT

## 2017-01-29 PROCEDURE — 85025 COMPLETE CBC W/AUTO DIFF WBC: CPT | Performed by: EMERGENCY MEDICINE

## 2017-01-29 PROCEDURE — 81001 URINALYSIS AUTO W/SCOPE: CPT | Performed by: EMERGENCY MEDICINE

## 2017-01-29 PROCEDURE — 96361 HYDRATE IV INFUSION ADD-ON: CPT

## 2017-01-29 PROCEDURE — 87086 URINE CULTURE/COLONY COUNT: CPT | Performed by: EMERGENCY MEDICINE

## 2017-01-29 RX ORDER — CEPHALEXIN 500 MG/1
500 CAPSULE ORAL 4 TIMES DAILY
Qty: 28 CAP | Refills: 0 | Status: SHIPPED | OUTPATIENT
Start: 2017-01-29 | End: 2017-02-05

## 2017-01-29 RX ORDER — CEPHALEXIN 250 MG/1
500 CAPSULE ORAL
Status: COMPLETED | OUTPATIENT
Start: 2017-01-29 | End: 2017-01-29

## 2017-01-29 RX ORDER — ONDANSETRON 2 MG/ML
4 INJECTION INTRAMUSCULAR; INTRAVENOUS
Status: COMPLETED | OUTPATIENT
Start: 2017-01-29 | End: 2017-01-29

## 2017-01-29 RX ORDER — ONDANSETRON 4 MG/1
4 TABLET, ORALLY DISINTEGRATING ORAL
Qty: 20 TAB | Refills: 0 | Status: SHIPPED | OUTPATIENT
Start: 2017-01-29 | End: 2019-05-17

## 2017-01-29 RX ORDER — ONDANSETRON 4 MG/1
4 TABLET, FILM COATED ORAL
Qty: 20 TAB | Refills: 0 | Status: SHIPPED | OUTPATIENT
Start: 2017-01-29 | End: 2017-01-29

## 2017-01-29 RX ADMIN — SODIUM CHLORIDE 1000 ML: 900 INJECTION, SOLUTION INTRAVENOUS at 11:54

## 2017-01-29 RX ADMIN — CEPHALEXIN 500 MG: 250 CAPSULE ORAL at 12:16

## 2017-01-29 RX ADMIN — ONDANSETRON 4 MG: 2 INJECTION INTRAMUSCULAR; INTRAVENOUS at 11:54

## 2017-01-29 NOTE — DISCHARGE INSTRUCTIONS
Managing Morning Sickness: Care Instructions  Your Care Instructions  For many women, the toughest part of early pregnancy is morning sickness. Morning sickness can range from mild nausea to severe nausea with bouts of vomiting. Symptoms may be worse in the morning, although they can strike at any time of the day or night. If you have nausea, vomiting, or both, look for safe measures that can bring you relief. You can take simple steps at home to manage morning sickness. These steps include changing what and when you eat and avoiding certain foods and smells. Some women find that acupuncture and acupressure wristbands also help. Follow-up care is a key part of your treatment and safety. Be sure to make and go to all appointments, and call your doctor if you are having problems. It's also a good idea to know your test results and keep a list of the medicines you take. How can you care for yourself at home? · Keep food in your stomach, but not too much at once. Your nausea may be worse if your stomach is empty. Eat five or six small meals a day instead of three large meals. · For morning nausea, eat a small snack, such as a couple of crackers or dry biscuits, before rising. Allow a few minutes for your stomach to settle before you get out of bed slowly. · Drink plenty of fluids, enough so that your urine is light yellow or clear like water. If you have kidney, heart, or liver disease and have to limit fluids, talk with your doctor before you increase the amount of fluids you drink. Some women find that peppermint tea helps with nausea. · Eat more protein, such as chicken, fish, lean meat, beans, nuts, and seeds. · Eat carbohydrate foods, such as potatoes, whole-grain cereals, rice, and pasta. · Avoid smells and foods that make you feel nauseated. Spicy or high-fat foods, citrus juice, milk, coffee, and tea with caffeine often make nausea worse. · Do not drink alcohol. · Do not smoke.  Try not to be around others who smoke. If you need help quitting, talk to your doctor about stop-smoking programs and medicines. These can increase your chances of quitting for good. · If you are taking iron supplements, ask your doctor if they are necessary. Iron can make nausea worse. · Get lots of rest. Stress and fatigue can make your morning sickness worse. · Ask your doctor about taking prescription medicine, or over-the-counter products such as vitamin B6, doxylamine, or lilliam, to relieve your symptoms. Your doctor can tell you the doses that are safe for you. · Take your prenatal vitamins at night on a full stomach. When should you call for help? Call your doctor now or seek immediate medical care if:  · You are too sick to your stomach to drink any fluids. · You have symptoms of dehydration, such as:  ¨ Dry eyes and a dry mouth. ¨ Passing only a little dark urine. ¨ Feeling thirstier than usual.  · You have new symptoms such as diarrhea, fever, or belly pain. Watch closely for changes in your health, and be sure to contact your doctor if:  · You lose weight. · You have ongoing nausea and vomiting. Where can you learn more? Go to http://gemma-humberto.info/. Enter C636 in the search box to learn more about \"Managing Morning Sickness: Care Instructions. \"  Current as of: June 8, 2016  Content Version: 11.1  © 6953-8934 [employer]. Care instructions adapted under license by [employer] (which disclaims liability or warranty for this information). If you have questions about a medical condition or this instruction, always ask your healthcare professional. [place] [address] any warranty or liability for your use of this information.

## 2017-01-29 NOTE — ED PROVIDER NOTES
HPI Comments:   Edgar Chavez is a 19 y.o. female (A0) who is 20 weeks pregnant presenting to the ED with her fiance C/O nausea/vomiting which started 1 week ago. She has attempted to drink water, Gatorade, and juice but is unable to keep any fluids down. Pt has been seen at this facility 3x for the symptoms and had significant improvement with Zofran, but has since run out of the prescription. She has her next appointment with her OB/GYN tomorrow. Pt has a hx of morning sickness during her last pregnancy. Patient denies fever, diarrhea, constipation, lightheadedness, urinary symptoms, or any other symptoms or complaints. OB/GYN: [facility]    There are no other complaints, changes or physical findings at this time. Written by WING Kaur, as dictated by Maddie Chakraborty MD      The history is provided by the patient. No  was used. Past Medical History:   Diagnosis Date    Congenital heart defect        No past surgical history on file. Family History:   Problem Relation Age of Onset    No Known Problems Mother     No Known Problems Father     No Known Problems Sister     No Known Problems Brother     Diabetes Maternal Grandmother        Social History     Social History    Marital status: SINGLE     Spouse name: N/A    Number of children: N/A    Years of education: N/A     Occupational History    Not on file. Social History Main Topics    Smoking status: Former Smoker     Quit date: 2015    Smokeless tobacco: Not on file    Alcohol use No    Drug use: No    Sexual activity: Yes     Partners: Male     Other Topics Concern     Service No    Sleep Concern No    Weight Concern No    Seat Belt Yes     Social History Narrative         ALLERGIES: Review of patient's allergies indicates no known allergies. Review of Systems   Constitutional: Negative for chills and fever. Respiratory: Negative for shortness of breath.     Cardiovascular: Negative for chest pain. Gastrointestinal: Positive for nausea and vomiting. Negative for constipation and diarrhea. Genitourinary: Negative for difficulty urinating, dysuria, frequency and hematuria. Neurological: Negative for weakness, light-headedness and numbness. All other systems reviewed and are negative. Vitals:    01/29/17 1106   BP: 116/73   Pulse: 92   Resp: 16   Temp: 98.2 °F (36.8 °C)   SpO2: 100%   Weight: 73.9 kg (162 lb 14.7 oz)   Height: 5' 5\" (1.651 m)            Physical Exam   Constitutional: She is oriented to person, place, and time. She appears well-developed and well-nourished. HENT:   Head: Normocephalic and atraumatic. Mouth/Throat: Mucous membranes are dry. Eyes: Conjunctivae and EOM are normal.   Neck: Normal range of motion. Neck supple. Cardiovascular: Normal rate and regular rhythm. Pulmonary/Chest: Effort normal and breath sounds normal. No respiratory distress. Abdominal: Soft. She exhibits no distension. There is no tenderness. Gravid abd   Musculoskeletal: Normal range of motion. Neurological: She is alert and oriented to person, place, and time. Skin: Skin is warm and dry. Psychiatric: She has a normal mood and affect. Nursing note and vitals reviewed. MDM  Number of Diagnoses or Management Options  Acute cystitis without hematuria:   Morning sickness:   Diagnosis management comments: Patient presents with nausea and vomiting. Differential includes morning sickness, gastritis/GERD, pancreatitis cholelithiasis, cholecystitis, hepatitis, renal pathology, ACS, gastroenteritis, infection such as UTI/PNA. Will obtain labs and fluids. Likely all morning sickness.         Amount and/or Complexity of Data Reviewed  Clinical lab tests: ordered and reviewed  Review and summarize past medical records: yes    Patient Progress  Patient progress: stable    Procedures    LABORATORY TESTS:  Recent Results (from the past 12 hour(s))   URINALYSIS W/ REFLEX CULTURE    Collection Time: 01/29/17 11:12 AM   Result Value Ref Range    Color DARK YELLOW      Appearance TURBID (A) CLEAR      Specific gravity 1.020 1.003 - 1.030      pH (UA) 7.0 5.0 - 8.0      Protein 100 (A) NEG mg/dL    Glucose NEGATIVE  NEG mg/dL    Ketone TRACE (A) NEG mg/dL    Blood NEGATIVE  NEG      Urobilinogen 1.0 0.2 - 1.0 EU/dL    Nitrites NEGATIVE  NEG      Leukocyte Esterase LARGE (A) NEG      WBC 20-50 0 - 4 /hpf    RBC 0-5 0 - 5 /hpf    Epithelial cells MODERATE (A) FEW /lpf    Bacteria 1+ (A) NEG /hpf    UA:UC IF INDICATED URINE CULTURE ORDERED (A) CNI     BILIRUBIN, CONFIRM    Collection Time: 01/29/17 11:12 AM   Result Value Ref Range    Bilirubin UA, confirm NEGATIVE  NEG     CBC WITH AUTOMATED DIFF    Collection Time: 01/29/17 11:27 AM   Result Value Ref Range    WBC 9.6 3.6 - 11.0 K/uL    RBC 3.94 3.80 - 5.20 M/uL    HGB 13.0 11.5 - 16.0 g/dL    HCT 38.7 35.0 - 47.0 %    MCV 98.2 80.0 - 99.0 FL    MCH 33.0 26.0 - 34.0 PG    MCHC 33.6 30.0 - 36.5 g/dL    RDW 13.4 11.5 - 14.5 %    PLATELET 031 076 - 373 K/uL    NEUTROPHILS 70 32 - 75 %    LYMPHOCYTES 20 12 - 49 %    MONOCYTES 8 5 - 13 %    EOSINOPHILS 2 0 - 7 %    BASOPHILS 0 0 - 1 %    ABS. NEUTROPHILS 6.7 1.8 - 8.0 K/UL    ABS. LYMPHOCYTES 1.9 0.8 - 3.5 K/UL    ABS. MONOCYTES 0.8 0.0 - 1.0 K/UL    ABS. EOSINOPHILS 0.2 0.0 - 0.4 K/UL    ABS. BASOPHILS 0.0 0.0 - 0.1 K/UL   METABOLIC PANEL, COMPREHENSIVE    Collection Time: 01/29/17 11:27 AM   Result Value Ref Range    Sodium 141 136 - 145 mmol/L    Potassium 3.5 3.5 - 5.1 mmol/L    Chloride 103 97 - 108 mmol/L    CO2 27 21 - 32 mmol/L    Anion gap 11 5 - 15 mmol/L    Glucose 77 65 - 100 mg/dL    BUN 6 6 - 20 MG/DL    Creatinine 0.84 0.55 - 1.02 MG/DL    BUN/Creatinine ratio 7 (L) 12 - 20      GFR est AA >60 >60 ml/min/1.73m2    GFR est non-AA >60 >60 ml/min/1.73m2    Calcium 9.4 8.5 - 10.1 MG/DL    Bilirubin, total 0.4 0.2 - 1.0 MG/DL    ALT 15 12 - 78 U/L    AST 11 (L) 15 - 37 U/L    Alk. phosphatase 65 45 - 117 U/L    Protein, total 7.4 6.4 - 8.2 g/dL    Albumin 3.4 (L) 3.5 - 5.0 g/dL    Globulin 4.0 2.0 - 4.0 g/dL    A-G Ratio 0.9 (L) 1.1 - 2.2         MEDICATIONS GIVEN:  Medications   sodium chloride 0.9 % bolus infusion 1,000 mL (1,000 mL IntraVENous New Bag 17 1154)   sodium chloride 0.9 % bolus infusion 1,000 mL (1,000 mL IntraVENous New Bag 17 1154)   ondansetron (ZOFRAN) injection 4 mg (4 mg IntraVENous Given 17 1154)   cephALEXin (KEFLEX) capsule 500 mg (500 mg Oral Given 17 1216)       IMPRESSION:  1. Morning sickness    2. Acute cystitis without hematuria        PLAN:  1. Current Discharge Medication List      START taking these medications    Details   ondansetron hcl (ZOFRAN, AS HYDROCHLORIDE,) 4 mg tablet Take 1 Tab by mouth every eight (8) hours as needed for Nausea. Qty: 20 Tab, Refills: 0      cephALEXin (KEFLEX) 500 mg capsule Take 1 Cap by mouth four (4) times daily for 7 days. Qty: 28 Cap, Refills: 0         CONTINUE these medications which have NOT CHANGED    Details   promethazine (PHENERGAN) 25 mg tablet Take 1 Tab by mouth every six (6) hours as needed for Nausea for up to 12 doses. Qty: 12 Tab, Refills: 0      prenatal multivit-ca-min-fe-fa tab Take 1 UNSPECIFIED by mouth daily. Indications: Pregnancy      metoclopramide HCl (REGLAN) 5 mg tablet Take 1 Tab by mouth every eight (8) hours as needed. For nausea  Qty: 30 Tab, Refills: 0         STOP taking these medications       ondansetron (ZOFRAN ODT) 4 mg disintegrating tablet Comments:   Reason for Stopping:         ondansetron (ZOFRAN ODT) 4 mg disintegrating tablet Comments:   Reason for Stopping:         ondansetron (ZOFRAN ODT) 4 mg disintegrating tablet Comments:   Reason for Stoppin.   Follow-up Information     Follow up With Details Comments Contact Info    Your OB tmrw In 1 day          Return to ED if worse     Discharge Note:  1:13 PM  The patient is ready for discharge.  The patient's signs, symptoms, diagnosis, and discharge instruction have been discussed and the patient has conveyed their understanding. The patient is to follow up as recommended or return to the ER should their symptoms worsen. Plan has been discussed and the patient is in agreement. Written by Holly Negron ED Scribe, as dictated by Joni Ma MD.     Attestation: This note is prepared by Holly Negron, acting as Scribe for Joni Ma MD.    Joni Ma MD: The scribe's documentation has been prepared under my direction and personally reviewed by me in its entirety. I confirm that the note above accurately reflects all work, treatment, procedures, and medical decision making performed by me.

## 2017-01-29 NOTE — ED NOTES
Pt c/o vomiting x1 week 2-3 times a day. Pt is 20 weeks pregnant and reported \"I always come in here when I'm vomiting cause I get dehydrated so yall give me fluids. \"  Pt reported vomit is brown. Denies any other complaints at this time.

## 2017-01-30 LAB
BACTERIA SPEC CULT: NORMAL
CC UR VC: NORMAL
SERVICE CMNT-IMP: NORMAL

## 2019-04-15 ENCOUNTER — HOSPITAL ENCOUNTER (EMERGENCY)
Age: 24
Discharge: HOME | End: 2019-04-15
Attending: EMERGENCY MEDICINE
Payer: SELF-PAY

## 2019-04-15 VITALS
SYSTOLIC BLOOD PRESSURE: 111 MMHG | WEIGHT: 225.75 LBS | HEIGHT: 65 IN | BODY MASS INDEX: 37.61 KG/M2 | TEMPERATURE: 99.4 F | RESPIRATION RATE: 23 BRPM | HEART RATE: 107 BPM | OXYGEN SATURATION: 98 % | DIASTOLIC BLOOD PRESSURE: 84 MMHG

## 2019-04-15 DIAGNOSIS — R11.2 NON-INTRACTABLE VOMITING WITH NAUSEA, UNSPECIFIED VOMITING TYPE: ICD-10-CM

## 2019-04-15 DIAGNOSIS — R07.89 ATYPICAL CHEST PAIN: Primary | ICD-10-CM

## 2019-04-15 LAB
ALBUMIN SERPL-MCNC: 3.8 G/DL (ref 3.5–5)
ALBUMIN/GLOB SERPL: 1 {RATIO} (ref 1.1–2.2)
ALP SERPL-CCNC: 69 U/L (ref 45–117)
ALT SERPL-CCNC: 30 U/L (ref 12–78)
ANION GAP SERPL CALC-SCNC: 6 MMOL/L (ref 5–15)
AST SERPL-CCNC: 18 U/L (ref 15–37)
ATRIAL RATE: 126 BPM
BASOPHILS # BLD: 0 K/UL (ref 0–0.1)
BASOPHILS NFR BLD: 0 % (ref 0–1)
BILIRUB SERPL-MCNC: 0.3 MG/DL (ref 0.2–1)
BNP SERPL-MCNC: 12 PG/ML
BUN SERPL-MCNC: 13 MG/DL (ref 6–20)
BUN/CREAT SERPL: 13 (ref 12–20)
CALCIUM SERPL-MCNC: 9.1 MG/DL (ref 8.5–10.1)
CALCULATED P AXIS, ECG09: 40 DEGREES
CALCULATED R AXIS, ECG10: 17 DEGREES
CALCULATED T AXIS, ECG11: 1 DEGREES
CHLORIDE SERPL-SCNC: 107 MMOL/L (ref 97–108)
CO2 SERPL-SCNC: 26 MMOL/L (ref 21–32)
CREAT SERPL-MCNC: 1.04 MG/DL (ref 0.55–1.02)
DIAGNOSIS, 93000: NORMAL
DIFFERENTIAL METHOD BLD: ABNORMAL
EOSINOPHIL # BLD: 0 K/UL (ref 0–0.4)
EOSINOPHIL NFR BLD: 0 % (ref 0–7)
ERYTHROCYTE [DISTWIDTH] IN BLOOD BY AUTOMATED COUNT: 14.1 % (ref 11.5–14.5)
GLOBULIN SER CALC-MCNC: 3.7 G/DL (ref 2–4)
GLUCOSE SERPL-MCNC: 140 MG/DL (ref 65–100)
HCT VFR BLD AUTO: 37.6 % (ref 35–47)
HGB BLD-MCNC: 12.2 G/DL (ref 11.5–16)
IMM GRANULOCYTES # BLD AUTO: 0.1 K/UL (ref 0–0.04)
IMM GRANULOCYTES NFR BLD AUTO: 0 % (ref 0–0.5)
LYMPHOCYTES # BLD: 1.4 K/UL (ref 0.8–3.5)
LYMPHOCYTES NFR BLD: 12 % (ref 12–49)
MCH RBC QN AUTO: 29.8 PG (ref 26–34)
MCHC RBC AUTO-ENTMCNC: 32.4 G/DL (ref 30–36.5)
MCV RBC AUTO: 91.7 FL (ref 80–99)
MONOCYTES # BLD: 0.5 K/UL (ref 0–1)
MONOCYTES NFR BLD: 4 % (ref 5–13)
NEUTS SEG # BLD: 9.4 K/UL (ref 1.8–8)
NEUTS SEG NFR BLD: 84 % (ref 32–75)
NRBC # BLD: 0 K/UL (ref 0–0.01)
NRBC BLD-RTO: 0 PER 100 WBC
P-R INTERVAL, ECG05: 160 MS
PLATELET # BLD AUTO: 264 K/UL (ref 150–400)
PMV BLD AUTO: 8.8 FL (ref 8.9–12.9)
POTASSIUM SERPL-SCNC: 4.1 MMOL/L (ref 3.5–5.1)
PROT SERPL-MCNC: 7.5 G/DL (ref 6.4–8.2)
Q-T INTERVAL, ECG07: 304 MS
QRS DURATION, ECG06: 74 MS
QTC CALCULATION (BEZET), ECG08: 440 MS
RBC # BLD AUTO: 4.1 M/UL (ref 3.8–5.2)
SODIUM SERPL-SCNC: 139 MMOL/L (ref 136–145)
TROPONIN I SERPL-MCNC: <0.05 NG/ML
VENTRICULAR RATE, ECG03: 126 BPM
WBC # BLD AUTO: 11.3 K/UL (ref 3.6–11)

## 2019-04-15 PROCEDURE — 85025 COMPLETE CBC W/AUTO DIFF WBC: CPT

## 2019-04-15 PROCEDURE — 83880 ASSAY OF NATRIURETIC PEPTIDE: CPT

## 2019-04-15 PROCEDURE — 80053 COMPREHEN METABOLIC PANEL: CPT

## 2019-04-15 PROCEDURE — 36415 COLL VENOUS BLD VENIPUNCTURE: CPT

## 2019-04-15 PROCEDURE — 94762 N-INVAS EAR/PLS OXIMTRY CONT: CPT

## 2019-04-15 PROCEDURE — 99284 EMERGENCY DEPT VISIT MOD MDM: CPT

## 2019-04-15 PROCEDURE — 93005 ELECTROCARDIOGRAM TRACING: CPT

## 2019-04-15 PROCEDURE — 84484 ASSAY OF TROPONIN QUANT: CPT

## 2019-04-15 RX ORDER — LOPERAMIDE HYDROCHLORIDE 2 MG/1
2 CAPSULE ORAL
Qty: 20 CAP | Refills: 0 | Status: SHIPPED | OUTPATIENT
Start: 2019-04-15 | End: 2019-05-20

## 2019-04-15 RX ORDER — ONDANSETRON 4 MG/1
4 TABLET, ORALLY DISINTEGRATING ORAL
Qty: 10 TAB | Refills: 0 | Status: SHIPPED | OUTPATIENT
Start: 2019-04-15 | End: 2019-04-15

## 2019-04-15 RX ORDER — ONDANSETRON 4 MG/1
4 TABLET, ORALLY DISINTEGRATING ORAL
Qty: 10 TAB | Refills: 0 | Status: SHIPPED | OUTPATIENT
Start: 2019-04-15 | End: 2019-05-17

## 2019-04-15 NOTE — ED PROVIDER NOTES
EMERGENCY DEPARTMENT HISTORY AND PHYSICAL EXAM  
     
 
Date: 4/15/2019 Patient Name: Brisa Goodwin History of Presenting Illness Chief Complaint Patient presents with  Chest Pain  
  intermittent CP and SOB since 0100. denies symptoms at time of triage.  Shortness of Breath History Provided By:  Patient HPI: Brisa Goodwin is a 20 y.o. female, without sig pmhx, who presents ambulatory to the ED with c/o chest pain that began at 1/130 this morning. Patient reports having aching pain that comes in waves is midsternally located and nonradiating but with associated shortness of breath. Patient denies taking any medications at home. Patient notes that her chest pain and shortness of breath symptoms are now resolved although now having nausea and vomiting while in the waiting room. Denies associated diarrhea or abdominal.  Denies any fever while at home. Social Hx: + tobacco  denies EtOH , denies Illicit Drugs There are no other complaints, changes, or physical findings at this time. No Known Allergies Current Outpatient Medications Medication Sig Dispense Refill  ondansetron (ZOFRAN ODT) 4 mg disintegrating tablet Take 1 Tab by mouth every eight (8) hours as needed for Nausea. 20 Tab 0  promethazine (PHENERGAN) 25 mg tablet Take 1 Tab by mouth every six (6) hours as needed for Nausea for up to 12 doses. 12 Tab 0  prenatal multivit-ca-min-fe-fa tab Take 1 UNSPECIFIED by mouth daily. Indications: Pregnancy  metoclopramide HCl (REGLAN) 5 mg tablet Take 1 Tab by mouth every eight (8) hours as needed. For nausea 30 Tab 0 Past History Past Medical History: 
Past Medical History:  
Diagnosis Date  Congenital heart defect Past Surgical History: No past surgical history on file. Family History: 
Family History Problem Relation Age of Onset  No Known Problems Mother  No Known Problems Father  No Known Problems Sister  No Known Problems Brother  Diabetes Maternal Grandmother Social History: 
Social History Tobacco Use  Smoking status: Former Smoker Last attempt to quit: 11/16/2015 Years since quitting: 3.4 Substance Use Topics  Alcohol use: No  
 Drug use: No  
 
 
Allergies: 
No Known Allergies Review of Systems Review of Systems Constitutional: Negative. Negative for fever. Eyes: Negative. Respiratory: Positive for shortness of breath. Cardiovascular: Positive for chest pain. Gastrointestinal: Positive for nausea and vomiting. Negative for abdominal pain. Endocrine: Negative. Genitourinary: Negative. Negative for difficulty urinating, dysuria and hematuria. Musculoskeletal: Negative. Skin: Negative. Neurological: Negative. Psychiatric/Behavioral: Negative for suicidal ideas. All other systems reviewed and are negative. Physical Exam  
Physical Exam  
Constitutional: She is oriented to person, place, and time. She appears well-developed and well-nourished. No distress. HENT:  
Head: Normocephalic and atraumatic. Nose: Nose normal.  
Eyes: Conjunctivae and EOM are normal. No scleral icterus. Neck: Normal range of motion. No tracheal deviation present. Cardiovascular: Normal rate, regular rhythm, normal heart sounds and intact distal pulses. Exam reveals no friction rub. No murmur heard. Pulmonary/Chest: Effort normal and breath sounds normal. No stridor. No respiratory distress. She has no wheezes. She has no rales. Abdominal: Soft. Bowel sounds are normal. She exhibits no distension. There is no tenderness. There is no rebound. Musculoskeletal: Normal range of motion. She exhibits no tenderness. Neurological: She is alert and oriented to person, place, and time. No cranial nerve deficit. Skin: Skin is warm and dry. No rash noted. She is not diaphoretic. Psychiatric: She has a normal mood and affect.  Her speech is normal and behavior is normal. Judgment and thought content normal. Cognition and memory are normal.  
Nursing note and vitals reviewed. Diagnostic Study Results Labs - No results found for this or any previous visit (from the past 12 hour(s)). Radiologic Studies - Recent Results (from the past 186 hour(s)) EKG, 12 LEAD, INITIAL Collection Time: 04/15/19  2:04 AM  
Result Value Ref Range Ventricular Rate 126 BPM  
 Atrial Rate 126 BPM  
 P-R Interval 160 ms QRS Duration 74 ms Q-T Interval 304 ms QTC Calculation (Bezet) 440 ms Calculated P Axis 40 degrees Calculated R Axis 17 degrees Calculated T Axis 1 degrees Diagnosis Sinus tachycardia Nonspecific T wave abnormality When compared with ECG of 19-NOV-2016 14:50, 
ST no longer elevated in Anterior leads Nonspecific T wave abnormality now evident in Anterior leads Confirmed by Snoal Jones ([id number]) on 4/15/2019 11:02:39 AM 
  
CBC WITH AUTOMATED DIFF Collection Time: 04/15/19  4:25 AM  
Result Value Ref Range WBC 11.3 (H) 3.6 - 11.0 K/uL  
 RBC 4.10 3.80 - 5.20 M/uL  
 HGB 12.2 11.5 - 16.0 g/dL HCT 37.6 35.0 - 47.0 % MCV 91.7 80.0 - 99.0 FL  
 MCH 29.8 26.0 - 34.0 PG  
 MCHC 32.4 30.0 - 36.5 g/dL  
 RDW 14.1 11.5 - 14.5 % PLATELET 021 181 - 050 K/uL MPV 8.8 (L) 8.9 - 12.9 FL  
 NRBC 0.0 0  WBC ABSOLUTE NRBC 0.00 0.00 - 0.01 K/uL NEUTROPHILS 84 (H) 32 - 75 % LYMPHOCYTES 12 12 - 49 % MONOCYTES 4 (L) 5 - 13 % EOSINOPHILS 0 0 - 7 % BASOPHILS 0 0 - 1 % IMMATURE GRANULOCYTES 0 0.0 - 0.5 % ABS. NEUTROPHILS 9.4 (H) 1.8 - 8.0 K/UL  
 ABS. LYMPHOCYTES 1.4 0.8 - 3.5 K/UL  
 ABS. MONOCYTES 0.5 0.0 - 1.0 K/UL  
 ABS. EOSINOPHILS 0.0 0.0 - 0.4 K/UL  
 ABS. BASOPHILS 0.0 0.0 - 0.1 K/UL  
 ABS. IMM. GRANS. 0.1 (H) 0.00 - 0.04 K/UL  
 DF AUTOMATED METABOLIC PANEL, COMPREHENSIVE Collection Time: 04/15/19  4:25 AM  
Result Value Ref Range  Sodium 139 136 - 145 mmol/L  
 Potassium 4.1 3.5 - 5.1 mmol/L Chloride 107 97 - 108 mmol/L  
 CO2 26 21 - 32 mmol/L Anion gap 6 5 - 15 mmol/L Glucose 140 (H) 65 - 100 mg/dL BUN 13 6 - 20 MG/DL Creatinine 1.04 (H) 0.55 - 1.02 MG/DL  
 BUN/Creatinine ratio 13 12 - 20 GFR est AA >60 >60 ml/min/1.73m2 GFR est non-AA >60 >60 ml/min/1.73m2 Calcium 9.1 8.5 - 10.1 MG/DL Bilirubin, total 0.3 0.2 - 1.0 MG/DL  
 ALT (SGPT) 30 12 - 78 U/L  
 AST (SGOT) 18 15 - 37 U/L Alk. phosphatase 69 45 - 117 U/L Protein, total 7.5 6.4 - 8.2 g/dL Albumin 3.8 3.5 - 5.0 g/dL Globulin 3.7 2.0 - 4.0 g/dL A-G Ratio 1.0 (L) 1.1 - 2.2 NT-PRO BNP Collection Time: 04/15/19  4:25 AM  
Result Value Ref Range NT pro-BNP 12 <125 PG/ML  
TROPONIN I Collection Time: 04/15/19  4:25 AM  
Result Value Ref Range Troponin-I, Qt. <0.05 <0.05 ng/mL No orders to display CT Results  (Last 48 hours) None CXR Results  (Last 48 hours) None Medical Decision Making I am the first provider for this patient. I reviewed the vital signs, available nursing notes, past medical history, past surgical history, family history and social history. Vital Signs-Reviewed the patient's vital signs. Patient Vitals for the past 12 hrs: 
 Temp Pulse Resp BP SpO2  
04/15/19 0201 99.4 °F (37.4 °C) (!) 122 16 137/77 100 % Pulse Oximetry Analysis - 100% on RA Cardiac Monitor:  
Rate: 122 bpm 
Rhythm: Sinus tach Records Reviewed: Nursing Notes and Old Medical Records Provider Notes (Medical Decision Making): DDX: 
Atypical chest pain, ACS, PE, pleural effusion, pneumonia, viral enteritis Plan: 
Labs, IV fluid, antiemetic Impression: 
Atypical chest pain, nausea/vomiting ED Course:  
Initial assessment performed.  The patients presenting problems have been discussed, and they are in agreement with the care plan formulated and outlined with them. I have encouraged them to ask questions as they arise throughout their visit. I reviewed our electronic medical record system for any past medical records that were available that may contribute to the patients current condition, the nursing notes and and vital signs from today's visit Nursing notes will be reviewed as they become available in realtime while the pt has been in the ED. Joe Pope MD 
 
 
  
 
6741 Progress note: 
Pt noted to be feeling better, HR improved, ready for discharge. Discussed lab findings with pt. Pt will follow up with this department or primary care of choice as instructed. All questions have been answered, pt voiced understanding and agreement with plan. If narcotics were prescribed, pt's  record was reviewed and pt was advised not to drive or operate heavy machinery. If abx were prescribed, pt advised that diarrhea and rash are possible side effects of the medications. Specific return precautions provided in addition to instructions for pt to return to the ED immediately should sx worsen at any time. Joe Pope MD 
 
 
Critical Care Time:  
 
none Diagnosis Clinical Impression: 1. Atypical chest pain 2. Non-intractable vomiting with nausea, unspecified vomiting type PLAN: 
1. Discharge Medication List as of 4/15/2019  5:37 AM  
  
START taking these medications Details  
!! ondansetron (ZOFRAN ODT) 4 mg disintegrating tablet Take 1 Tab by mouth every eight (8) hours as needed for Nausea., Normal, Disp-10 Tab, R-0  
  
 !! - Potential duplicate medications found. Please discuss with provider. CONTINUE these medications which have NOT CHANGED  Details  
!! ondansetron (ZOFRAN ODT) 4 mg disintegrating tablet Take 1 Tab by mouth every eight (8) hours as needed for Nausea., Normal, Disp-20 Tab, R-0  
  
promethazine (PHENERGAN) 25 mg tablet Take 1 Tab by mouth every six (6) hours as needed for Nausea for up to 12 doses. , Print, Disp-12 Tab, R-0  
  
prenatal multivit-ca-min-fe-fa tab Take 1 UNSPECIFIED by mouth daily. Indications: Pregnancy, Historical Med  
  
metoclopramide HCl (REGLAN) 5 mg tablet Take 1 Tab by mouth every eight (8) hours as needed. For nausea, Print, Disp-30 Tab, R-0  
  
 !! - Potential duplicate medications found. Please discuss with provider. 2.  
Follow-up Information Follow up With Specialties Details Why Contact Info [facility] EMERGENCY DEPT Emergency Medicine  As needed [address] [facility] 
[phone] Return to ED if worse Disposition: 
5830 The patient's results have been reviewed with family and/or caregiver. They verbally convey their understanding and agreement of the patient's signs, symptoms, diagnosis, treatment and prognosis and additionally agree to follow up as recommended in the discharge instructions or to return to the Emergency Room should the patient's condition change prior to their follow-up appointment. The family and/or caregiver verbally agrees with the care-plan and all of their questions have been answered. The discharge instructions have also been provided to the them with educational information regarding the patient's diagnosis as well a list of reasons why the patient would want to return to the ER prior to their follow-up appointment should their condition change. Hue Jett MD 
 
 
 
 
Please note that this dictation was completed with [employer], the computer voice recognition software. Quite often unanticipated grammatical, syntax, homophones, and other interpretive errors are inadvertently transcribed by the computer software. Please disregard these errors. Please excuse any errors that have escaped final proofreading This note will not be viewable in [address]

## 2019-04-15 NOTE — DISCHARGE INSTRUCTIONS
Patient Education        Chest Pain: Care Instructions  Your Care Instructions    There are many things that can cause chest pain. Some are not serious and will get better on their own in a few days. But some kinds of chest pain need more testing and treatment. Your doctor may have recommended a follow-up visit in the next 8 to 12 hours. If you are not getting better, you may need more tests or treatment. Even though your doctor has released you, you still need to watch for any problems. The doctor carefully checked you, but sometimes problems can develop later. If you have new symptoms or if your symptoms do not get better, get medical care right away. If you have worse or different chest pain or pressure that lasts more than 5 minutes or you passed out (lost consciousness), call 911 or seek other emergency help right away. A medical visit is only one step in your treatment. Even if you feel better, you still need to do what your doctor recommends, such as going to all suggested follow-up appointments and taking medicines exactly as directed. This will help you recover and help prevent future problems. How can you care for yourself at home? · Rest until you feel better. · Take your medicine exactly as prescribed. Call your doctor if you think you are having a problem with your medicine. · Do not drive after taking a prescription pain medicine. When should you call for help? Call 911 if:    · You passed out (lost consciousness).     · You have severe difficulty breathing.     · You have symptoms of a heart attack. These may include:  ? Chest pain or pressure, or a strange feeling in your chest.  ? Sweating. ? Shortness of breath. ? Nausea or vomiting. ? Pain, pressure, or a strange feeling in your back, neck, jaw, or upper belly or in one or both shoulders or arms. ? Lightheadedness or sudden weakness. ? A fast or irregular heartbeat.   After you call 911, the  may tell you to chew 1 adult-strength or 2 to 4 low-dose aspirin. Wait for an ambulance. Do not try to drive yourself.    Call your doctor today if:    · You have any trouble breathing.     · Your chest pain gets worse.     · You are dizzy or lightheaded, or you feel like you may faint.     · You are not getting better as expected.     · You are having new or different chest pain. Where can you learn more? Go to http://gemma-humberto.info/. Enter A120 in the search box to learn more about \"Chest Pain: Care Instructions. \"  Current as of: September 23, 2018  Content Version: 11.9  © 9793-4571 [employer]. Care instructions adapted under license by [employer] (which disclaims liability or warranty for this information). If you have questions about a medical condition or this instruction, always ask your healthcare professional. [place] [address] any warranty or liability for your use of this information. Patient Education        Nausea and Vomiting: Care Instructions  Your Care Instructions    When you are nauseated, you may feel weak and sweaty and notice a lot of saliva in your mouth. Nausea often leads to vomiting. Most of the time you do not need to worry about nausea and vomiting, but they can be signs of other illnesses. Two common causes of nausea and vomiting are stomach flu and food poisoning. Nausea and vomiting from viral stomach flu will usually start to improve within 24 hours. Nausea and vomiting from food poisoning may last from 12 to 48 hours. The doctor has checked you carefully, but problems can develop later. If you notice any problems or new symptoms, get medical treatment right away. Follow-up care is a key part of your treatment and safety. Be sure to make and go to all appointments, and call your doctor if you are having problems. It's also a good idea to know your test results and keep a list of the medicines you take.   How can you care for yourself at home? · To prevent dehydration, drink plenty of fluids, enough so that your urine is light yellow or clear like water. Choose water and other caffeine-free clear liquids until you feel better. If you have kidney, heart, or liver disease and have to limit fluids, talk with your doctor before you increase the amount of fluids you drink. · Rest in bed until you feel better. · When you are able to eat, try clear soups, mild foods, and liquids until all symptoms are gone for 12 to 48 hours. Other good choices include dry toast, crackers, cooked cereal, and gelatin dessert, such as Jell-O. When should you call for help? Call 911 anytime you think you may need emergency care. For example, call if:    · You passed out (lost consciousness).    Call your doctor now or seek immediate medical care if:    · You have symptoms of dehydration, such as:  ? Dry eyes and a dry mouth. ? Passing only a little dark urine. ? Feeling thirstier than usual.     · You have new or worsening belly pain.     · You have a new or higher fever.     · You vomit blood or what looks like coffee grounds.    Watch closely for changes in your health, and be sure to contact your doctor if:    · You have ongoing nausea and vomiting.     · Your vomiting is getting worse.     · Your vomiting lasts longer than 2 days.     · You are not getting better as expected. Where can you learn more? Go to http://gemma-humberto.info/. Enter 25 [id number] in the search box to learn more about \"Nausea and Vomiting: Care Instructions. \"  Current as of: September 23, 2018  Content Version: 11.9  © 2759-0302 [employer]. Care instructions adapted under license by [employer] (which disclaims liability or warranty for this information).  If you have questions about a medical condition or this instruction, always ask your healthcare professional. [address] any warranty or liability for your use of this information.

## 2019-04-15 NOTE — ED NOTES
Patient states she had a chest pain around 130 and immediately came here, denies pain at this time. States vomiting x1, denies abdominal pain. Denies cardiac history, denies sob. Placed on cardiac monitor, sinus tach, , O2 sat 97% on RA. Appears in no distress. States she thinks she \"has a flu\".

## 2019-05-13 ENCOUNTER — OFFICE VISIT (OUTPATIENT)
Dept: INTERNAL MEDICINE CLINIC | Age: 24
End: 2019-05-13

## 2019-05-13 VITALS
WEIGHT: 213.3 LBS | SYSTOLIC BLOOD PRESSURE: 142 MMHG | HEART RATE: 111 BPM | DIASTOLIC BLOOD PRESSURE: 92 MMHG | TEMPERATURE: 98.9 F | HEIGHT: 66 IN | BODY MASS INDEX: 34.28 KG/M2 | OXYGEN SATURATION: 100 %

## 2019-05-13 DIAGNOSIS — R51.9 ACUTE NONINTRACTABLE HEADACHE, UNSPECIFIED HEADACHE TYPE: ICD-10-CM

## 2019-05-13 DIAGNOSIS — H72.92 PERFORATED EAR DRUM, LEFT: ICD-10-CM

## 2019-05-13 DIAGNOSIS — N30.00 ACUTE CYSTITIS WITHOUT HEMATURIA: ICD-10-CM

## 2019-05-13 DIAGNOSIS — R00.2 PALPITATIONS: Primary | ICD-10-CM

## 2019-05-13 DIAGNOSIS — Z12.4 CERVICAL CANCER SCREENING: ICD-10-CM

## 2019-05-13 DIAGNOSIS — I10 ESSENTIAL HYPERTENSION, BENIGN: ICD-10-CM

## 2019-05-13 DIAGNOSIS — N76.0 ACUTE VAGINITIS: ICD-10-CM

## 2019-05-13 DIAGNOSIS — Z13.220 SCREENING FOR LIPOID DISORDERS: ICD-10-CM

## 2019-05-13 DIAGNOSIS — R19.7 DIARRHEA, UNSPECIFIED TYPE: ICD-10-CM

## 2019-05-13 DIAGNOSIS — R73.09 ELEVATED GLUCOSE: ICD-10-CM

## 2019-05-13 PROBLEM — R11.15 NON-INTRACTABLE CYCLICAL VOMITING WITH NAUSEA: Status: ACTIVE | Noted: 2019-05-13

## 2019-05-13 PROBLEM — K59.1 FUNCTIONAL DIARRHEA: Status: ACTIVE | Noted: 2019-05-13

## 2019-05-13 PROBLEM — G44.89 OTHER HEADACHE SYNDROME: Status: ACTIVE | Noted: 2019-05-13

## 2019-05-13 PROBLEM — R00.0 SINUS TACHYCARDIA: Status: ACTIVE | Noted: 2019-05-13

## 2019-05-13 RX ORDER — METRONIDAZOLE 500 MG/1
500 TABLET ORAL 2 TIMES DAILY
Qty: 14 TAB | Refills: 0 | Status: SHIPPED | OUTPATIENT
Start: 2019-05-13 | End: 2019-05-20 | Stop reason: SINTOL

## 2019-05-13 RX ORDER — ACETAMINOPHEN 325 MG/1
TABLET ORAL
Qty: 40 TAB | Refills: 0 | Status: SHIPPED | OUTPATIENT
Start: 2019-05-13 | End: 2019-06-10

## 2019-05-13 RX ORDER — DIPHENOXYLATE HYDROCHLORIDE AND ATROPINE SULFATE 2.5; .025 MG/1; MG/1
2 TABLET ORAL
Qty: 20 TAB | Refills: 0 | Status: SHIPPED | OUTPATIENT
Start: 2019-05-13 | End: 2019-05-20

## 2019-05-13 RX ORDER — AMLODIPINE BESYLATE 5 MG/1
5 TABLET ORAL DAILY
Qty: 90 TAB | Refills: 0 | Status: SHIPPED | OUTPATIENT
Start: 2019-05-13 | End: 2019-09-10 | Stop reason: SDUPTHER

## 2019-05-13 NOTE — PROGRESS NOTES
Chief Complaint Patient presents with  Migraine Patient is here for headaches and fasts heart beats. Patient is new to Dr. Camilo Castillo. 1. Have you been to the ER, urgent care clinic since your last visit? Hospitalized since your last visit? Yes Reason for visit: Headaches 2. Have you seen or consulted any other health care providers outside of the [address] [place] [address] since your last visit? Include any pap smears or colon screening.  Yes Where: [facility] [place]

## 2019-05-13 NOTE — PROGRESS NOTES
V?Subjective:  
 
Srikanth Dee is a 20 y.o. female who presents for follow up of chronic hypertension, headache for a few days, palpitations x 2 weeks. . Blood pressure has been elevated at office visits/ED visit following HTN of pregnancy in 2017. Headache is left sided, throbbing and occurs with sound sensitivity. trigger-na  Tylenol helps. There is no associated nausea, light sensitivity or  family history of migraine. Seen by ED physicians at [address] [place] [address] within the past month for nausea-now resolved, headache and palpitations. She was told her potassium was low and it was replaced in the ED. Diet and Lifestyle: not attempting to follow a low fat, low cholesterol diet, not attempting to follow a low sodium diet, sedentary, nonsmoker, caffeine intake xero-very low, alcohol intake :none Home BP Monitoring: is not measured at home Cardiovascular ROS: never prescribed antihypertensive medications, no TIA's, no chest pain on exertion, no dyspnea on exertion, no swelling of ankles, no orthostatic dizziness or lightheadedness, no orthopnea or paroxysmal nocturnal dyspnea;  palpitations described as racing heart accompanied by a heartburn sensation  with stair or hill climbing, that slows when activity is stopped; no intermittent claudication symptoms. New concerns: +diarrhea for a few days- abd pain  -constipatlon;  heartburn with palpitations, no leg edema, + imbalance,  mild fatigue  , legs \"shake\" then stop , intentional wt loss - household sickness    Last intercourse last night    x 2     6yo, 3yo children , moderate stress only,   Allergic rhinitis is year round and   claritin works well  for stuffy nose sneezing -cough -sinus pressure. Denies etoh, tobacco, drugs Patient Active Problem List  
Diagnosis Code  Overweight (BMI 25.0-29. 9) E66.3  Pregnancy Z34.90 Patient Active Problem List  
 Diagnosis Date Noted  Overweight (BMI 25.0-29.9) 12/15/2016  Pregnancy 12/15/2016 Current Outpatient Medications Medication Sig Dispense Refill  loperamide (IMODIUM) 2 mg capsule Take 1 Cap by mouth four (4) times daily as needed for Diarrhea. 20 Cap 0  
 ondansetron (ZOFRAN ODT) 4 mg disintegrating tablet Take 1 Tab by mouth every eight (8) hours as needed for Nausea. 10 Tab 0  
 ondansetron (ZOFRAN ODT) 4 mg disintegrating tablet Take 1 Tab by mouth every eight (8) hours as needed for Nausea. 20 Tab 0  promethazine (PHENERGAN) 25 mg tablet Take 1 Tab by mouth every six (6) hours as needed for Nausea for up to 12 doses. 12 Tab 0  prenatal multivit-ca-min-fe-fa tab Take 1 UNSPECIFIED by mouth daily. Indications: Pregnancy  metoclopramide HCl (REGLAN) 5 mg tablet Take 1 Tab by mouth every eight (8) hours as needed. For nausea 30 Tab 0 No Known Allergies Past Medical History:  
Diagnosis Date  Congenital heart defect  Hypertension History reviewed. No pertinent surgical history. Family History Problem Relation Age of Onset  No Known Problems Mother  No Known Problems Father  No Known Problems Sister  No Known Problems Brother  Diabetes Maternal Grandmother Social History Tobacco Use  Smoking status: Former Smoker Last attempt to quit: 11/16/2015 Years since quitting: 3.4  Smokeless tobacco: Former User Substance Use Topics  Alcohol use: No  
  
 
Lab Results Component Value Date/Time WBC 11.3 (H) 04/15/2019 04:25 AM  
 HGB 12.2 04/15/2019 04:25 AM  
 HCT 37.6 04/15/2019 04:25 AM  
 PLATELET 316 72/33/0512 04:25 AM  
 MCV 91.7 04/15/2019 04:25 AM  
 
No results found for: CHOL, CHOLPOCT, HDL, LDL, LDLC, LDLCPOC, LDLCEXT, TRIGL, TGLPOCT, CHHD, CHHDX No results found for: TSH, TSH2, TSH3, TSHP, TSHELE, TSHEXT, TT3, T3U, T3UP, FRT3, FT3, FT4, FT4P, T4, T4P, FT4T, TT7, TSHEXT Lab Results Component Value Date/Time NT pro-BNP 12 04/15/2019 04:25 AM  
  
Lab Results Component Value Date/Time Sodium 139 04/15/2019 04:25 AM  
 Potassium 4.1 04/15/2019 04:25 AM  
 Chloride 107 04/15/2019 04:25 AM  
 CO2 26 04/15/2019 04:25 AM  
 Anion gap 6 04/15/2019 04:25 AM  
 Glucose 140 (H) 04/15/2019 04:25 AM  
 BUN 13 04/15/2019 04:25 AM  
 Creatinine 1.04 (H) 04/15/2019 04:25 AM  
 BUN/Creatinine ratio 13 04/15/2019 04:25 AM  
 GFR est AA >60 04/15/2019 04:25 AM  
 GFR est non-AA >60 04/15/2019 04:25 AM  
 Calcium 9.1 04/15/2019 04:25 AM  
 Bilirubin, total 0.3 04/15/2019 04:25 AM  
 ALT (SGPT) 30 04/15/2019 04:25 AM  
 AST (SGOT) 18 04/15/2019 04:25 AM  
 Alk. phosphatase 69 04/15/2019 04:25 AM  
 Protein, total 7.5 04/15/2019 04:25 AM  
 Albumin 3.8 04/15/2019 04:25 AM  
 Globulin 3.7 04/15/2019 04:25 AM  
 A-G Ratio 1.0 (L) 04/15/2019 04:25 AM  
  
No results found for: HBA1C, GAJ3ZFVX, HGBE8, PMH9BZRO, GAA1LMJD Review of Systems, additional: 
Pertinent items are noted in HPI. Review of Systems Constitutional: Positive for malaise/fatigue (minor fatigue) and weight loss (from dieting). Negative for chills and fever. HENT: Positive for congestion. Negative for ear pain, sore throat and tinnitus. Eyes: Positive for photophobia. Negative for blurred vision, double vision, pain, discharge and redness. Respiratory: Negative for cough, shortness of breath and wheezing. Cardiovascular: Positive for palpitations. Negative for chest pain, orthopnea, claudication, leg swelling and PND. Gastrointestinal: Positive for diarrhea (for 2 days, not improving), heartburn (with palpitation), nausea and vomiting. Negative for abdominal pain, blood in stool, constipation and melena. Genitourinary: Positive for frequency. Negative for dysuria, flank pain, hematuria and urgency. Musculoskeletal: Positive for neck pain (left neck pain x 2 days). Skin: Negative for itching and rash. Neurological: Positive for weakness and headaches.  Negative for dizziness, tingling, tremors, speech change, focal weakness, seizures and loss of consciousness. Endo/Heme/Allergies: Positive for environmental allergies. Negative for polydipsia. Bruises/bleeds easily. Psychiatric/Behavioral: Negative for depression, hallucinations, memory loss, substance abuse and suicidal ideas. The patient has insomnia (recent onset, decreased sleep maintenance, +snoring). The patient is not nervous/anxious. Objective:  
 
Visit Vitals BP (!) 142/92 Pulse (!) 111          repeat pr 102 Temp 98.9 °F (37.2 °C) Ht 5' 6\" (1.676 m) Wt 213 lb 4.8 oz (96.8 kg) SpO2 100% BMI 34.43 kg/m² Physical Exam  
Constitutional: She is oriented to person, place, and time. She appears well-developed and well-nourished. (hears a \"roaring\" sound when asked) HENT:  
Head: Normocephalic and atraumatic. Right Ear: External ear normal.  
Left Ear: External ear normal.  
Mouth/Throat: Oropharynx is clear and moist.  
Left Tm perforation Dry mm Turbinate edema Eyes: Pupils are equal, round, and reactive to light. Conjunctivae and EOM are normal.  
Neck: Normal range of motion. Neck supple. No JVD present. No tracheal deviation present. Thyromegaly present. Thyromegaly Cardiovascular: Normal rate, regular rhythm, normal heart sounds and intact distal pulses. Exam reveals no gallop and no friction rub. No murmur heard. Pulmonary/Chest: Effort normal and breath sounds normal. She exhibits no tenderness. Abdominal: Soft. Bowel sounds are normal. She exhibits no distension and no mass. There is no tenderness. There is no rebound and no guarding. No hernia. Genitourinary: Uterus normal. Vaginal discharge found. Genitourinary Comments: Malodorous homogenous white vaginal discharge Musculoskeletal: Normal range of motion. She exhibits no edema, tenderness or deformity. Lymphadenopathy:  
  She has no cervical adenopathy. Neurological: She is alert and oriented to person, place, and time. Skin: Skin is warm and dry. Psychiatric: She has a normal mood and affect. Her behavior is normal.  
 
Assessment/Plan: ICD-10-CM ICD-9-CM 1. Acute cystitis without hematuria N30.00 595.0 URINALYSIS W/ RFLX MICROSCOPIC  
   CULTURE, URINE 2. Palpitations R00.2 785.1 CBC WITH AUTOMATED DIFF  
   METABOLIC PANEL, COMPREHENSIVE  
   TSH REFLEX TO T4  
   HCG QL SERUM AMB POC EKG ROUTINE W/ 12 LEADS, INTER & REP 3. Screening for lipoid disorders Z13.220 V77.91 LIPID PANEL 4. Diarrhea, unspecified type R19.7 787.91 TSH REFLEX TO T4  
   diphenoxylate-atropine (LOMOTIL) 2.5-0.025 mg per tablet 5. Perforated ear drum, left H72.92 384.20 REFERRAL TO ENT-OTOLARYNGOLOGY 6. Acute nonintractable headache, unspecified headache type R51 784.0 SED RATE (ESR)  
   acetaminophen (TYLENOL) 325 mg tablet 7. Essential hypertension, benign I10 401.1 amLODIPine (NORVASC) 5 mg tablet, lifestyle modification,  
8. Acute vaginitis N76.0 616.10 NUSWAB VAGINITIS PLUS  
   metroNIDAZOLE (FLAGYL) 500 mg tablet 9. Cervical cancer screening Z12.4 V76.2 PAP, LB, RFX HPV [id number]) 10. Leukocytosis                                                                                 Repeat WBC today Lifestyle modification Fluids RTO 4 wks

## 2019-05-15 LAB
A VAGINAE DNA VAG QL NAA+PROBE: ABNORMAL SCORE
ALBUMIN SERPL-MCNC: 4.8 G/DL (ref 3.5–5.5)
ALBUMIN/GLOB SERPL: 1.9 {RATIO} (ref 1.2–2.2)
ALP SERPL-CCNC: 63 IU/L (ref 39–117)
ALT SERPL-CCNC: 16 IU/L (ref 0–32)
APPEARANCE UR: CLEAR
AST SERPL-CCNC: 15 IU/L (ref 0–40)
B-HCG SERPL QL: NEGATIVE MIU/ML
BACTERIA UR CULT: NORMAL
BASOPHILS # BLD AUTO: 0 X10E3/UL (ref 0–0.2)
BASOPHILS NFR BLD AUTO: 0 %
BILIRUB SERPL-MCNC: 0.2 MG/DL (ref 0–1.2)
BILIRUB UR QL STRIP: NEGATIVE
BUN SERPL-MCNC: 8 MG/DL (ref 6–20)
BUN/CREAT SERPL: 8 (ref 9–23)
BVAB2 DNA VAG QL NAA+PROBE: ABNORMAL SCORE
C ALBICANS DNA VAG QL NAA+PROBE: NEGATIVE
C GLABRATA DNA VAG QL NAA+PROBE: NEGATIVE
C TRACH RRNA SPEC QL NAA+PROBE: NEGATIVE
CALCIUM SERPL-MCNC: 9.5 MG/DL (ref 8.7–10.2)
CHLORIDE SERPL-SCNC: 99 MMOL/L (ref 96–106)
CHOLEST SERPL-MCNC: 143 MG/DL (ref 100–199)
CO2 SERPL-SCNC: 21 MMOL/L (ref 20–29)
COLOR UR: YELLOW
CREAT SERPL-MCNC: 1.02 MG/DL (ref 0.57–1)
EOSINOPHIL # BLD AUTO: 0 X10E3/UL (ref 0–0.4)
EOSINOPHIL NFR BLD AUTO: 1 %
ERYTHROCYTE [DISTWIDTH] IN BLOOD BY AUTOMATED COUNT: 15.2 % (ref 12.3–15.4)
ERYTHROCYTE [SEDIMENTATION RATE] IN BLOOD BY WESTERGREN METHOD: 14 MM/HR (ref 0–32)
GLOBULIN SER CALC-MCNC: 2.5 G/DL (ref 1.5–4.5)
GLUCOSE SERPL-MCNC: 103 MG/DL (ref 65–99)
GLUCOSE UR QL: NEGATIVE
HCT VFR BLD AUTO: 39.9 % (ref 34–46.6)
HDLC SERPL-MCNC: 29 MG/DL
HGB BLD-MCNC: 12.8 G/DL (ref 11.1–15.9)
HGB UR QL STRIP: NEGATIVE
IMM GRANULOCYTES # BLD AUTO: 0 X10E3/UL (ref 0–0.1)
IMM GRANULOCYTES NFR BLD AUTO: 0 %
KETONES UR QL STRIP: ABNORMAL
LDLC SERPL CALC-MCNC: 99 MG/DL (ref 0–99)
LEUKOCYTE ESTERASE UR QL STRIP: NEGATIVE
LYMPHOCYTES # BLD AUTO: 1.1 X10E3/UL (ref 0.7–3.1)
LYMPHOCYTES NFR BLD AUTO: 14 %
MCH RBC QN AUTO: 28.8 PG (ref 26.6–33)
MCHC RBC AUTO-ENTMCNC: 32.1 G/DL (ref 31.5–35.7)
MCV RBC AUTO: 90 FL (ref 79–97)
MEGA1 DNA VAG QL NAA+PROBE: ABNORMAL SCORE
MICRO URNS: ABNORMAL
MONOCYTES # BLD AUTO: 0.5 X10E3/UL (ref 0.1–0.9)
MONOCYTES NFR BLD AUTO: 6 %
N GONORRHOEA RRNA SPEC QL NAA+PROBE: NEGATIVE
NEUTROPHILS # BLD AUTO: 6.2 X10E3/UL (ref 1.4–7)
NEUTROPHILS NFR BLD AUTO: 79 %
NITRITE UR QL STRIP: NEGATIVE
PH UR STRIP: 6 [PH] (ref 5–7.5)
PLATELET # BLD AUTO: 331 X10E3/UL (ref 150–379)
POTASSIUM SERPL-SCNC: 4.4 MMOL/L (ref 3.5–5.2)
PROT SERPL-MCNC: 7.3 G/DL (ref 6–8.5)
PROT UR QL STRIP: NEGATIVE
RBC # BLD AUTO: 4.45 X10E6/UL (ref 3.77–5.28)
SODIUM SERPL-SCNC: 140 MMOL/L (ref 134–144)
SP GR UR: 1.01 (ref 1–1.03)
T VAGINALIS RRNA SPEC QL NAA+PROBE: NEGATIVE
TRIGL SERPL-MCNC: 73 MG/DL (ref 0–149)
TSH SERPL DL<=0.005 MIU/L-ACNC: 0.47 UIU/ML (ref 0.45–4.5)
UROBILINOGEN UR STRIP-MCNC: 0.2 MG/DL (ref 0.2–1)
VLDLC SERPL CALC-MCNC: 15 MG/DL (ref 5–40)
WBC # BLD AUTO: 7.8 X10E3/UL (ref 3.4–10.8)

## 2019-05-17 ENCOUNTER — HOSPITAL ENCOUNTER (EMERGENCY)
Age: 24
Discharge: HOME | End: 2019-05-17
Attending: EMERGENCY MEDICINE
Payer: MEDICAID

## 2019-05-17 ENCOUNTER — APPOINTMENT (OUTPATIENT)
Dept: CT IMAGING | Age: 24
End: 2019-05-17
Payer: MEDICAID

## 2019-05-17 ENCOUNTER — APPOINTMENT (OUTPATIENT)
Dept: GENERAL RADIOLOGY | Age: 24
End: 2019-05-17
Attending: EMERGENCY MEDICINE
Payer: MEDICAID

## 2019-05-17 VITALS
HEIGHT: 66 IN | TEMPERATURE: 98.5 F | BODY MASS INDEX: 33.48 KG/M2 | OXYGEN SATURATION: 98 % | WEIGHT: 208.34 LBS | RESPIRATION RATE: 16 BRPM | SYSTOLIC BLOOD PRESSURE: 154 MMHG | HEART RATE: 100 BPM | DIASTOLIC BLOOD PRESSURE: 104 MMHG

## 2019-05-17 DIAGNOSIS — R03.0 ELEVATED BLOOD PRESSURE READING: ICD-10-CM

## 2019-05-17 DIAGNOSIS — N76.0 VAGINOSIS: ICD-10-CM

## 2019-05-17 DIAGNOSIS — R10.32 ABDOMINAL PAIN, LLQ (LEFT LOWER QUADRANT): Primary | ICD-10-CM

## 2019-05-17 LAB
APPEARANCE UR: CLEAR
BACTERIA URNS QL MICRO: ABNORMAL /HPF
BASOPHILS # BLD: 0 K/UL (ref 0–0.1)
BASOPHILS NFR BLD: 0 % (ref 0–1)
BILIRUB UR QL: NEGATIVE
COLOR UR: ABNORMAL
DIFFERENTIAL METHOD BLD: ABNORMAL
EOSINOPHIL # BLD: 0 K/UL (ref 0–0.4)
EOSINOPHIL NFR BLD: 0 % (ref 0–7)
EPITH CASTS URNS QL MICRO: ABNORMAL /LPF
ERYTHROCYTE [DISTWIDTH] IN BLOOD BY AUTOMATED COUNT: 14 % (ref 11.5–14.5)
GLUCOSE UR STRIP.AUTO-MCNC: NEGATIVE MG/DL
HCG UR QL: NEGATIVE
HCT VFR BLD AUTO: 42.5 % (ref 35–47)
HGB BLD-MCNC: 13.7 G/DL (ref 11.5–16)
HGB UR QL STRIP: ABNORMAL
HYALINE CASTS URNS QL MICRO: ABNORMAL /LPF (ref 0–5)
IMM GRANULOCYTES # BLD AUTO: 0 K/UL (ref 0–0.04)
IMM GRANULOCYTES NFR BLD AUTO: 0 % (ref 0–0.5)
KETONES UR QL STRIP.AUTO: 40 MG/DL
LEUKOCYTE ESTERASE UR QL STRIP.AUTO: ABNORMAL
LYMPHOCYTES # BLD: 2.1 K/UL (ref 0.8–3.5)
LYMPHOCYTES NFR BLD: 19 % (ref 12–49)
MCH RBC QN AUTO: 29.2 PG (ref 26–34)
MCHC RBC AUTO-ENTMCNC: 32.2 G/DL (ref 30–36.5)
MCV RBC AUTO: 90.6 FL (ref 80–99)
MONOCYTES # BLD: 0.6 K/UL (ref 0–1)
MONOCYTES NFR BLD: 6 % (ref 5–13)
NEUTS SEG # BLD: 8.1 K/UL (ref 1.8–8)
NEUTS SEG NFR BLD: 75 % (ref 32–75)
NITRITE UR QL STRIP.AUTO: NEGATIVE
NRBC # BLD: 0 K/UL (ref 0–0.01)
NRBC BLD-RTO: 0 PER 100 WBC
PH UR STRIP: 6.5 [PH] (ref 5–8)
PLATELET # BLD AUTO: 361 K/UL (ref 150–400)
PMV BLD AUTO: 9 FL (ref 8.9–12.9)
PROT UR STRIP-MCNC: NEGATIVE MG/DL
RBC # BLD AUTO: 4.69 M/UL (ref 3.8–5.2)
RBC #/AREA URNS HPF: ABNORMAL /HPF (ref 0–5)
SP GR UR REFRACTOMETRY: 1.01 (ref 1–1.03)
UA: UC IF INDICATED,UAUC: ABNORMAL
UROBILINOGEN UR QL STRIP.AUTO: 0.2 EU/DL (ref 0.2–1)
WBC # BLD AUTO: 10.9 K/UL (ref 3.6–11)
WBC URNS QL MICRO: ABNORMAL /HPF (ref 0–4)

## 2019-05-17 PROCEDURE — 74177 CT ABD & PELVIS W/CONTRAST: CPT

## 2019-05-17 PROCEDURE — 81001 URINALYSIS AUTO W/SCOPE: CPT

## 2019-05-17 PROCEDURE — 74011250636 HC RX REV CODE- 250/636: Performed by: EMERGENCY MEDICINE

## 2019-05-17 PROCEDURE — 74011250637 HC RX REV CODE- 250/637: Performed by: PHYSICIAN ASSISTANT

## 2019-05-17 PROCEDURE — 36415 COLL VENOUS BLD VENIPUNCTURE: CPT

## 2019-05-17 PROCEDURE — 81025 URINE PREGNANCY TEST: CPT

## 2019-05-17 PROCEDURE — 74011636320 HC RX REV CODE- 636/320: Performed by: EMERGENCY MEDICINE

## 2019-05-17 PROCEDURE — 85025 COMPLETE CBC W/AUTO DIFF WBC: CPT

## 2019-05-17 PROCEDURE — 74019 RADEX ABDOMEN 2 VIEWS: CPT

## 2019-05-17 PROCEDURE — 87086 URINE CULTURE/COLONY COUNT: CPT

## 2019-05-17 PROCEDURE — 96360 HYDRATION IV INFUSION INIT: CPT

## 2019-05-17 PROCEDURE — 99284 EMERGENCY DEPT VISIT MOD MDM: CPT

## 2019-05-17 PROCEDURE — 96361 HYDRATE IV INFUSION ADD-ON: CPT

## 2019-05-17 RX ORDER — DICYCLOMINE HYDROCHLORIDE 20 MG/1
20 TABLET ORAL
Status: COMPLETED | OUTPATIENT
Start: 2019-05-17 | End: 2019-05-17

## 2019-05-17 RX ORDER — ONDANSETRON 4 MG/1
4 TABLET, ORALLY DISINTEGRATING ORAL
Qty: 10 TAB | Refills: 0 | Status: SHIPPED | OUTPATIENT
Start: 2019-05-17 | End: 2019-05-17

## 2019-05-17 RX ORDER — ONDANSETRON 4 MG/1
4 TABLET, ORALLY DISINTEGRATING ORAL
Qty: 10 TAB | Refills: 0 | Status: SHIPPED | OUTPATIENT
Start: 2019-05-17 | End: 2019-06-10

## 2019-05-17 RX ORDER — DICYCLOMINE HYDROCHLORIDE 20 MG/1
20 TABLET ORAL EVERY 6 HOURS
Qty: 20 TAB | Refills: 0 | Status: SHIPPED | OUTPATIENT
Start: 2019-05-17 | End: 2019-05-22

## 2019-05-17 RX ORDER — SODIUM CHLORIDE 0.9 % (FLUSH) 0.9 %
10 SYRINGE (ML) INJECTION
Status: DISCONTINUED | OUTPATIENT
Start: 2019-05-17 | End: 2019-05-17 | Stop reason: HOSPADM

## 2019-05-17 RX ORDER — DICYCLOMINE HYDROCHLORIDE 20 MG/1
20 TABLET ORAL EVERY 6 HOURS
Qty: 20 TAB | Refills: 0 | Status: SHIPPED | OUTPATIENT
Start: 2019-05-17 | End: 2019-05-17

## 2019-05-17 RX ADMIN — IOPAMIDOL 100 ML: 755 INJECTION, SOLUTION INTRAVENOUS at 18:54

## 2019-05-17 RX ADMIN — SODIUM CHLORIDE 1000 ML: 900 INJECTION, SOLUTION INTRAVENOUS at 16:30

## 2019-05-17 RX ADMIN — DICYCLOMINE HYDROCHLORIDE 20 MG: 20 TABLET ORAL at 18:29

## 2019-05-17 NOTE — ED PROVIDER NOTES
EMERGENCY DEPARTMENT HISTORY AND PHYSICAL EXAM      Date: 5/17/2019  Patient Name: Jevon Riggins    History of Presenting Illness     Chief Complaint   Patient presents with    Abdominal Pain     Pt ambulatory to triage with c/o l-sided abdominal pain x today; denies N/V/D; denies fever, chills; History Provided By: Patient    HPI: Jevon Riggins, 20 y.o. female presents ambulatory to the ED with c/o L sided abdominal pain upon awakening today. She denied any symptoms on 5/16/19. Pt denied ill contacts. No h/o chronic abdominal issues. No N/V/D. No change in appetite. She has had no fever/chills. No BRBPR/melena. She denied use of alcohol. Pt denied dysuria/hematuria. Denied chance of pregnancy. She reports she was seen recently by her Ob/Gyn and treated for vaginitis. She has been without vaginal bleeding or pain. Chief Complaint: LLQ abdominal pain  Duration: 1 Days  Timing:  Acute  Location: LLQ abdomen  Quality: Cramping  Severity: Mild  Modifying Factors: no exacerbating/alleviating features  Associated Symptoms: denies any other associated signs or symptoms        There are no other complaints, changes, or physical findings at this time. PCP: None    Current Facility-Administered Medications   Medication Dose Route Frequency Provider Last Rate Last Dose    sodium chloride (NS) flush 10 mL  10 mL IntraVENous RAD ONCE Katerina Peterson MD         Current Outpatient Medications   Medication Sig Dispense Refill    dicyclomine (BENTYL) 20 mg tablet Take 1 Tab by mouth every six (6) hours for 20 doses. 20 Tab 0    ondansetron (ZOFRAN ODT) 4 mg disintegrating tablet Take 1 Tab by mouth every eight (8) hours as needed for Nausea for up to 10 doses. 10 Tab 0    amLODIPine (NORVASC) 5 mg tablet Take 1 Tab by mouth daily. Take for blood pressure 90 Tab 0    metroNIDAZOLE (FLAGYL) 500 mg tablet Take 1 Tab by mouth two (2) times a day.  14 Tab 0    acetaminophen (TYLENOL) 325 mg tablet Take 2 every 6 hr prn pain not to exceed 6 tabs in 24 hr 40 Tab 0    diphenoxylate-atropine (LOMOTIL) 2.5-0.025 mg per tablet Take 2 Tabs by mouth four (4) times daily as needed for Diarrhea. Max Daily Amount: 8 Tabs. 20 Tab 0    loperamide (IMODIUM) 2 mg capsule Take 1 Cap by mouth four (4) times daily as needed for Diarrhea. 20 Cap 0    promethazine (PHENERGAN) 25 mg tablet Take 1 Tab by mouth every six (6) hours as needed for Nausea for up to 12 doses. 12 Tab 0    prenatal multivit-ca-min-fe-fa tab Take 1 UNSPECIFIED by mouth daily. Indications: Pregnancy      metoclopramide HCl (REGLAN) 5 mg tablet Take 1 Tab by mouth every eight (8) hours as needed. For nausea 30 Tab 0       Past History     Past Medical History:  Past Medical History:   Diagnosis Date    Congenital heart defect     Hypertension        Past Surgical History:  History reviewed. No pertinent surgical history. Family History:  Family History   Problem Relation Age of Onset    No Known Problems Mother     No Known Problems Father     No Known Problems Sister     No Known Problems Brother     Diabetes Maternal Grandmother        Social History:  Social History     Tobacco Use    Smoking status: Former Smoker     Last attempt to quit: 11/16/2015     Years since quitting: 3.5    Smokeless tobacco: Former User   Substance Use Topics    Alcohol use: No    Drug use: No       Allergies:  No Known Allergies      Review of Systems   Review of Systems   Constitutional: Negative for appetite change, chills and fever. HENT: Negative for congestion, rhinorrhea and sore throat. Respiratory: Negative for cough and shortness of breath. Cardiovascular: Negative for chest pain and palpitations. Gastrointestinal: Positive for abdominal pain. Negative for diarrhea, nausea and vomiting. Endocrine: Negative for polydipsia, polyphagia and polyuria.    Genitourinary: Negative for decreased urine volume, difficulty urinating, dysuria, hematuria, pelvic pain, vaginal bleeding, vaginal discharge and vaginal pain. Musculoskeletal: Negative for myalgias, neck pain and neck stiffness. Skin: Negative for rash and wound. Allergic/Immunologic: Negative for food allergies and immunocompromised state. Neurological: Negative for dizziness and headaches. Hematological: Negative for adenopathy. Does not bruise/bleed easily. Psychiatric/Behavioral: Negative for agitation and confusion. Physical Exam   Physical Exam   Constitutional: She is oriented to person, place, and time. She appears well-developed and well-nourished. No distress. WDWN AA female, alert, in NAD   HENT:   Head: Normocephalic and atraumatic. Nose: Nose normal.   Mouth/Throat: No oropharyngeal exudate. Dry oral mucosa   Eyes: Pupils are equal, round, and reactive to light. Conjunctivae and EOM are normal. Right eye exhibits no discharge. Left eye exhibits no discharge. No scleral icterus. Neck: Normal range of motion. Neck supple. No JVD present. No tracheal deviation present. No thyromegaly present. Cardiovascular: Normal rate, regular rhythm and normal heart sounds. Pulmonary/Chest: Effort normal and breath sounds normal. No respiratory distress. She has no wheezes. She exhibits no tenderness. Abdominal: Soft. She exhibits no distension and no mass. There is tenderness. There is no rebound and no guarding. abd soft, minimal LLQ tenderness, No CVAT   Musculoskeletal: Normal range of motion. She exhibits no edema. Lymphadenopathy:     She has no cervical adenopathy. Neurological: She is alert and oriented to person, place, and time. She exhibits normal muscle tone. Coordination normal.   Skin: Skin is warm and dry. She is not diaphoretic. No erythema. No pallor. Psychiatric: She has a normal mood and affect. Her behavior is normal. Judgment normal.   Nursing note and vitals reviewed.       Diagnostic Study Results     Labs -     Recent Results (from the past 12 hour(s))   URINALYSIS W/ REFLEX CULTURE    Collection Time: 05/17/19  2:37 PM   Result Value Ref Range    Color YELLOW/STRAW      Appearance CLEAR CLEAR      Specific gravity 1.008 1.003 - 1.030      pH (UA) 6.5 5.0 - 8.0      Protein NEGATIVE  NEG mg/dL    Glucose NEGATIVE  NEG mg/dL    Ketone 40 (A) NEG mg/dL    Bilirubin NEGATIVE  NEG      Blood SMALL (A) NEG      Urobilinogen 0.2 0.2 - 1.0 EU/dL    Nitrites NEGATIVE  NEG      Leukocyte Esterase SMALL (A) NEG      WBC 5-10 0 - 4 /hpf    RBC 0-5 0 - 5 /hpf    Epithelial cells FEW FEW /lpf    Bacteria 1+ (A) NEG /hpf    UA:UC IF INDICATED URINE CULTURE ORDERED (A) CNI      Hyaline cast 0-2 0 - 5 /lpf   HCG URINE, QL. - POC    Collection Time: 05/17/19  2:39 PM   Result Value Ref Range    Pregnancy test,urine (POC) NEGATIVE  NEG     CBC WITH AUTOMATED DIFF    Collection Time: 05/17/19  3:01 PM   Result Value Ref Range    WBC 10.9 3.6 - 11.0 K/uL    RBC 4.69 3.80 - 5.20 M/uL    HGB 13.7 11.5 - 16.0 g/dL    HCT 42.5 35.0 - 47.0 %    MCV 90.6 80.0 - 99.0 FL    MCH 29.2 26.0 - 34.0 PG    MCHC 32.2 30.0 - 36.5 g/dL    RDW 14.0 11.5 - 14.5 %    PLATELET 246 837 - 000 K/uL    MPV 9.0 8.9 - 12.9 FL    NRBC 0.0 0  WBC    ABSOLUTE NRBC 0.00 0.00 - 0.01 K/uL    NEUTROPHILS 75 32 - 75 %    LYMPHOCYTES 19 12 - 49 %    MONOCYTES 6 5 - 13 %    EOSINOPHILS 0 0 - 7 %    BASOPHILS 0 0 - 1 %    IMMATURE GRANULOCYTES 0 0.0 - 0.5 %    ABS. NEUTROPHILS 8.1 (H) 1.8 - 8.0 K/UL    ABS. LYMPHOCYTES 2.1 0.8 - 3.5 K/UL    ABS. MONOCYTES 0.6 0.0 - 1.0 K/UL    ABS. EOSINOPHILS 0.0 0.0 - 0.4 K/UL    ABS. BASOPHILS 0.0 0.0 - 0.1 K/UL    ABS. IMM. GRANS. 0.0 0.00 - 0.04 K/UL    DF AUTOMATED         Radiologic Studies -   CT ABD PELV W CONT   Final Result   IMPRESSION: No bowel obstruction, ileus or perforation. No intra-abdominal   abscess. XR ABD FLAT/ ERECT   Final Result   IMPRESSION:   No acute process.         CT Results  (Last 48 hours)               05/17/19 1859  CT ABD PELV W CONT Final result    Impression:  IMPRESSION: No bowel obstruction, ileus or perforation. No intra-abdominal   abscess. Narrative:  INDICATION: Left lower quadrant abdominal pain. CT of the abdomen and pelvis is performed with 5 mm collimation. Study is   performed with 100 cc of nonionic Isovue 370. Sagittal and coronal reformatted   images were also performed. CT dose reduction was achieved with the use of the standardized protocol   tailored for this examination and automatic exposure control for dose   modulation. Direct comparison is made to plain radiographs dated May 17, 2019 and prior MRI   dated November 2016. Findings:       Lung bases: The visualized lung bases are clear. Liver: There is a 5 mm cyst within the right hepatic lobe, unchanged compared to   prior MR November 2016. The liver is otherwise normal.       Adrenals: Adrenal glands are normal.       Pancreas: The pancreas is normal.       Gallbladder: The gallbladder is normal.       Kidneys: There is a 6 mm left renal cyst. The kidneys otherwise enhance promptly   and symmetrically. There is no hydronephrosis. Spleen: The spleen is normal.       Lymph nodes. There is no roshan hepatitis, mesenteric, retroperitoneal or pelvic   lymphadenopathy. Bowel: No thickened or dilated loop of large or small bowel is visualized. Appendix: The appendix is normal.       Urinary bladder: Urinary bladder is partially filled and grossly normal.       Miscellaneous: There is no free intraperitoneal fluid or gas. There is no focal   fluid collection to suggest abscess. Medical Decision Making   I am the first provider for this patient. I reviewed the vital signs, available nursing notes, past medical history, past surgical history, family history and social history. Vital Signs-Reviewed the patient's vital signs.   Patient Vitals for the past 12 hrs:   Temp Pulse Resp BP SpO2 05/17/19 1431 98.5 °F (36.9 °C) 100 16 (!) 154/104 98 %         Records Reviewed: Nursing Notes, Old Medical Records, Previous Radiology Studies and Previous Laboratory Studies      Provider Notes (Medical Decision Making):   Gastroenteritis, UTI, pregnancy, constipation, SBO      ED Course:   Initial assessment performed. The patients presenting problems have been discussed, and they are in agreement with the care plan formulated and outlined with them. I have encouraged them to ask questions as they arise throughout their visit. Reviewed lab results and xray. Pt requesting CT as she is concerned \"about something bad\". HTN COUNSELING  Reviewed the risks of uncontrolled HTN to include stroke, heart attack, renal failure, aneurysm and death. They will take their medications daily and follow up with their PCP for recheck. DISCHARGE NOTE:  The care plan has been outline with the patient and/or family, who verbally conveyed understanding and agreement. Available results have been reviewed. Patient and/or family understand the follow up plan as outlined and discharge instructions. Should their condition deterioration at any time after discharge the patient agrees to return, follow up sooner than outlined or seek medical assistance at the closest Emergency Room as soon as possible. Questions have been answered. Discharge instructions and educational information regarding the patient's diagnosis as well a list of reasons why the patient would want to seek immediate medical attention, should their condition change, were reviewed directly with the patient/family       PLAN:  1. Current Discharge Medication List      START taking these medications    Details   dicyclomine (BENTYL) 20 mg tablet Take 1 Tab by mouth every six (6) hours for 20 doses.   Qty: 20 Tab, Refills: 0      ondansetron (ZOFRAN ODT) 4 mg disintegrating tablet Take 1 Tab by mouth every eight (8) hours as needed for Nausea for up to 10 doses.  Qty: 10 Tab, Refills: 0           2. Follow-up Information     Follow up With Specialties Details Why Contact Info    Luz Elena Ferrell MD Internal Medicine   [address]  [facility]  [phone]      Queta Hdez MD Gastroenterology  As needed [address]  [phone] [facility] EMERGENCY DEPT Emergency Medicine  If symptoms worsen [address]  [address] [facility]  [phone]        Return to ED if worse     Diagnosis     Clinical Impression:   1. Abdominal pain, LLQ (left lower quadrant)    2. Vaginosis    3.  Elevated blood pressure reading

## 2019-05-17 NOTE — DISCHARGE INSTRUCTIONS
Rest, push fluids, liquid/bland diet x 24 hours then gradually advance as tolerated. Contact information provided for primary care for recheck of blood pressure as well as Gastroenterologist if abdominal pain symptoms persist.  Return to the Emergency Dept for any worsening pain, fever, nausea/vomiting/diarrhea, decreased oral intake/urine output.

## 2019-05-19 LAB
BACTERIA SPEC CULT: NORMAL
CC UR VC: NORMAL
SERVICE CMNT-IMP: NORMAL

## 2019-05-20 ENCOUNTER — OFFICE VISIT (OUTPATIENT)
Dept: INTERNAL MEDICINE CLINIC | Age: 24
End: 2019-05-20

## 2019-05-20 VITALS
TEMPERATURE: 100 F | BODY MASS INDEX: 32.98 KG/M2 | OXYGEN SATURATION: 99 % | HEIGHT: 66 IN | SYSTOLIC BLOOD PRESSURE: 140 MMHG | HEART RATE: 116 BPM | WEIGHT: 205.2 LBS | DIASTOLIC BLOOD PRESSURE: 98 MMHG

## 2019-05-20 DIAGNOSIS — R00.0 TACHYCARDIA: ICD-10-CM

## 2019-05-20 DIAGNOSIS — F32.9 MAJOR DEPRESSIVE DISORDER WITH CURRENT ACTIVE EPISODE, UNSPECIFIED DEPRESSION EPISODE SEVERITY, UNSPECIFIED WHETHER RECURRENT: ICD-10-CM

## 2019-05-20 DIAGNOSIS — J02.9 PHARYNGITIS, UNSPECIFIED ETIOLOGY: ICD-10-CM

## 2019-05-20 DIAGNOSIS — R87.613 HIGH GRADE SQUAMOUS INTRAEPITHELIAL CERVICAL DYSPLASIA: ICD-10-CM

## 2019-05-20 DIAGNOSIS — N76.0 ACUTE VAGINITIS: ICD-10-CM

## 2019-05-20 DIAGNOSIS — K76.89 SIMPLE HEPATIC CYST: ICD-10-CM

## 2019-05-20 DIAGNOSIS — E86.0 DEHYDRATION: ICD-10-CM

## 2019-05-20 DIAGNOSIS — N28.1 RENAL CYST, LEFT: ICD-10-CM

## 2019-05-20 DIAGNOSIS — R10.12 LUQ ABDOMINAL PAIN: ICD-10-CM

## 2019-05-20 DIAGNOSIS — R20.9 SENSATION OF COLD IN LEG: ICD-10-CM

## 2019-05-20 DIAGNOSIS — R11.0 NAUSEA: Primary | ICD-10-CM

## 2019-05-20 RX ORDER — METRONIDAZOLE 7.5 MG/G
1 GEL VAGINAL
Qty: 25 G | Refills: 0 | Status: SHIPPED | OUTPATIENT
Start: 2019-05-20 | End: 2019-05-25

## 2019-05-20 NOTE — PROGRESS NOTES
Sree Su is a 20 y.o. female who presents with multiple complaints. Pharyngitis Sore throat, night sweats, chills and  pain while swallowing for 2 days. She denies a history of uri symptoms, myalgias, chest pain, shortness of breath, vomiting, weakness, wheezing and cough. Patient does not smoke cigarettes. Nausea Patient complains of nausea but no vomiting. Onset of symptoms was 1-2 days ago. Patient describes nausea as moderate. Vomiting has occurred 0 times over the past days. Symptoms have been associated with  oral flagyl for past 5 days, LUQ  abdominal pain of significant severity occurring over the weekend- evaluated in ED, low appetite, ability to keep down fluids, feeling hot and cold, no close contact(s) with similar illness. Patient denies hematemesis, hematochezia, melena, constipation, possibility of pregnancy. Course to date has been intermittent. LUQ abdominal pain has mproved on bentyl. Evaluation to date-seen in [facility] ER over weekend: had negative CT scan abdomen//pelvis. Treatment to date has been zofran with last dose hours ago and bentyl as prescribed through ED, described as helpful. Depression She also presents with symptoms of depression. She complains of depressed mood, anhedonia, boredom, worry, psychomotor retardation and fatigue. Onset was approximately several months ago, gradually worsening since that time. She denies current suicidal and homicidal plan or intent. Possible organic causes contributing are: none. Risk factors: negative life event -ALFREDITO's death 1 yr ago. ALFREDITO raised her, other family deaths, unemployment, raising 2 young children, not reaching educational and business goals. Last completed high grade was ninth. Previous drug treatment includes none; other therapy: no other therapies. Cold feet, legs Noted since sitting on exam table for the past 45 min. Left foot tingles when positioned on right leg. No Raynauds history.  Left leg developed a throbbing pain  For 2 minutes Thursday night while in bed. No swelling or recurrence reported. Hypertension Tolerating amlodipine for past several days without side effects. Next appt in June. Denies caffeine intake. Patient Active Problem List  
Diagnosis Code  Overweight (BMI 25.0-29. 9) E66.3  Pregnancy Z34.90  
 Sinus tachycardia R00.0  Functional diarrhea K59.1  Vaginosis N76.0  Other headache syndrome G44.89  
 Perforated ear drum, left H72.92  
 Cervical cancer screening Z12.4  Non-intractable cyclical vomiting with nausea G43. A0 Patient Active Problem List  
 Diagnosis Date Noted  Sinus tachycardia 05/13/2019  Functional diarrhea 05/13/2019  Vaginosis 05/13/2019  Other headache syndrome 05/13/2019  Perforated ear drum, left 05/13/2019  Cervical cancer screening 05/13/2019  Non-intractable cyclical vomiting with nausea 05/13/2019  Overweight (BMI 25.0-29.9) 12/15/2016  Pregnancy 12/15/2016 Current Outpatient Medications Medication Sig Dispense Refill  metroNIDAZOLE (METROGEL) 0.75 % gel Insert 5 g into vagina nightly for 5 days. 25 g 0  
 dicyclomine (BENTYL) 20 mg tablet Take 1 Tab by mouth every six (6) hours for 20 doses. 20 Tab 0  
 ondansetron (ZOFRAN ODT) 4 mg disintegrating tablet Take 1 Tab by mouth every eight (8) hours as needed for Nausea for up to 10 doses. 10 Tab 0  
 amLODIPine (NORVASC) 5 mg tablet Take 1 Tab by mouth daily. Take for blood pressure 90 Tab 0  
 acetaminophen (TYLENOL) 325 mg tablet Take 2 every 6 hr prn pain not to exceed 6 tabs in 24 hr 40 Tab 0  prenatal multivit-ca-min-fe-fa tab Take 1 UNSPECIFIED by mouth daily. Indications: Pregnancy No Known Allergies Past Medical History:  
Diagnosis Date  Congenital heart defect  Hypertension History reviewed. No pertinent surgical history. Family History Problem Relation Age of Onset  No Known Problems Mother  No Known Problems Father  No Known Problems Sister  No Known Problems Brother  Diabetes Maternal Grandmother Social History Tobacco Use  Smoking status: Former Smoker Last attempt to quit: 11/16/2015 Years since quitting: 3.5  Smokeless tobacco: Former User Substance Use Topics  Alcohol use: No  
  
 
Lab Results Component Value Date/Time WBC 10.9 05/17/2019 03:01 PM  
 HGB 13.7 05/17/2019 03:01 PM  
 HCT 42.5 05/17/2019 03:01 PM  
 PLATELET 236 61/21/2725 03:01 PM  
 MCV 90.6 05/17/2019 03:01 PM  
 
Lab Results Component Value Date/Time Glucose 103 (H) 05/13/2019 01:15 PM  
 LDL, calculated 99 05/13/2019 01:15 PM  
 Creatinine 1.02 (H) 05/13/2019 01:15 PM  
  
Lab Results Component Value Date/Time Cholesterol, total 143 05/13/2019 01:15 PM  
 HDL Cholesterol 29 (L) 05/13/2019 01:15 PM  
 LDL, calculated 99 05/13/2019 01:15 PM  
 Triglyceride 73 05/13/2019 01:15 PM  
 
Lab Results Component Value Date/Time TSH 0.472 05/13/2019 01:15 PM  
  
Lab Results Component Value Date/Time NT pro-BNP 12 04/15/2019 04:25 AM  
  
Lab Results Component Value Date/Time Sodium 140 05/13/2019 01:15 PM  
 Potassium 4.4 05/13/2019 01:15 PM  
 Chloride 99 05/13/2019 01:15 PM  
 CO2 21 05/13/2019 01:15 PM  
 Anion gap 6 04/15/2019 04:25 AM  
 Glucose 103 (H) 05/13/2019 01:15 PM  
 BUN 8 05/13/2019 01:15 PM  
 Creatinine 1.02 (H) 05/13/2019 01:15 PM  
 BUN/Creatinine ratio 8 (L) 05/13/2019 01:15 PM  
 GFR est AA 88 05/13/2019 01:15 PM  
 GFR est non-AA 77 05/13/2019 01:15 PM  
 Calcium 9.5 05/13/2019 01:15 PM  
 Bilirubin, total 0.2 05/13/2019 01:15 PM  
 ALT (SGPT) 16 05/13/2019 01:15 PM  
 AST (SGOT) 15 05/13/2019 01:15 PM  
 Alk.  phosphatase 63 05/13/2019 01:15 PM  
 Protein, total 7.3 05/13/2019 01:15 PM  
 Albumin 4.8 05/13/2019 01:15 PM  
 Globulin 3.7 04/15/2019 04:25 AM  
 A-G Ratio 1.9 05/13/2019 01:15 PM  
  
 No results found for: HBA1C, EXC1GOSM, HGBE8, IMC1JBBU, UJY7VIVO Review of Systems, additional: 
Pertinent items are noted in HPI. Objective:  
 
Visit Vitals BP (!) 140/98 Pulse (!) 116 Temp 100 °F (37.8 °C) Ht 5' 6\" (1.676 m) Wt 205 lb 3.2 oz (93.1 kg) LMP 04/26/2019 (Within Days) SpO2 99% BMI 33.12 kg/m² Physical Exam  
Constitutional: She is oriented to person, place, and time. She appears well-developed and well-nourished. HENT:  
Dry mucous membranes. Oropharynx unremarkable. Eyes: Conjunctivae and EOM are normal.  
Neck: Normal range of motion. Neck supple. Cardiovascular: Regular rhythm and normal heart sounds. Exam reveals no gallop and no friction rub. No murmur heard. Rapid s1s2 Pulmonary/Chest: Breath sounds normal.  
Abdominal: Soft. Bowel sounds are normal. She exhibits no distension and no mass. There is no tenderness. There is no rebound and no guarding. Musculoskeletal: Normal range of motion. She exhibits no edema or deformity. Lower extremity pulses are normal  
Lymphadenopathy:  
  She has no cervical adenopathy. Neurological: She is alert and oriented to person, place, and time. Skin:  
Cool feet and lower legs. Plantar aspect of feet is dusky. Psychiatric: She has a normal mood and affect. Raid strep test- negative Assessment/Plan:  
 
hypertension borderline controlled. Nausea cold be from flagyl therapy 
reviewed diet, exercise and weight control 
recommended sodium restriction 
reviewed medications and side effects in detail. ICD-10-CM ICD-9-CM 1. Acute vaginitis N76.0 616.10 metroNIDAZOLE (METROGEL) 0.75 % gel 2. Renal cyst, left N28.1 753.10   
3. Simple hepatic cyst K76.89 573.8 4. Pharyngitis, unspecified etiology J02.9 462 Fluids, analgesics, gargles 5. Nausea R11.0 787.02 Stop oral flagyl; begin metrogel vag  
6. Dehydration E86.0 276.51 rehydration 7. Tachycardia R00.0 785.0 Recurrent; rule out primary and secondary causes 8. Depression, no ssri interest                                                           Exercise, nutrition, goal planning,  
9. Cold feet, legs, onset in office                                                         socks, warm soaks, keep feet propped up, report                                                                                                                               persistence 10. Elevated creatinine                                                                        Repeat next visit

## 2019-05-20 NOTE — PROGRESS NOTES
Chief Complaint Patient presents with  Leg Pain Patient is here for a 1 month follow up. Per patient she went is having left stomach pain. 1. Have you been to the ER, urgent care clinic since your last visit? Hospitalized since your last visit? Yes Reason for visit: stomach pain 2. Have you seen or consulted any other health care providers outside of the [address] [place] [address] since your last visit? Include any pap smears or colon screening.  No

## 2019-05-20 NOTE — PATIENT INSTRUCTIONS
Sore Throat: Care Instructions Your Care Instructions Infection by bacteria or a virus causes most sore throats. Cigarette smoke, dry air, air pollution, allergies, and yelling can also cause a sore throat. Sore throats can be painful and annoying. Fortunately, most sore throats go away on their own. If you have a bacterial infection, your doctor may prescribe antibiotics. Follow-up care is a key part of your treatment and safety. Be sure to make and go to all appointments, and call your doctor if you are having problems. It's also a good idea to know your test results and keep a list of the medicines you take. How can you care for yourself at home? · If your doctor prescribed antibiotics, take them as directed. Do not stop taking them just because you feel better. You need to take the full course of antibiotics. · Gargle with warm salt water once an hour to help reduce swelling and relieve discomfort. Use 1 teaspoon of salt mixed in 1 cup of warm water. · Take an over-the-counter pain medicine, such as acetaminophen (Tylenol), ibuprofen (Advil, Motrin), or naproxen (Aleve). Read and follow all instructions on the label. · Be careful when taking over-the-counter cold or flu medicines and Tylenol at the same time. Many of these medicines have acetaminophen, which is Tylenol. Read the labels to make sure that you are not taking more than the recommended dose. Too much acetaminophen (Tylenol) can be harmful. · Drink plenty of fluids. Fluids may help soothe an irritated throat. Hot fluids, such as tea or soup, may help decrease throat pain. · Use over-the-counter throat lozenges to soothe pain. Regular cough drops or hard candy may also help. These should not be given to young children because of the risk of choking. · Do not smoke or allow others to smoke around you. If you need help quitting, talk to your doctor about stop-smoking programs and medicines. These can increase your chances of quitting for good. · Use a vaporizer or humidifier to add moisture to your bedroom. Follow the directions for cleaning the machine. When should you call for help? Call your doctor now or seek immediate medical care if: 
  · You have new or worse trouble swallowing.  
  · Your sore throat gets much worse on one side.  
 Watch closely for changes in your health, and be sure to contact your doctor if you do not get better as expected. Where can you learn more? Go to http://gemma-humberto.info/. Enter [id number] [id number] in the search box to learn more about \"Sore Throat: Care Instructions. \" Current as of: March 27, 2018 Content Version: 11.9 © 2061-9167 [employer]. Care instructions adapted under license by [employer] (which disclaims liability or warranty for this information). If you have questions about a medical condition or this instruction, always ask your healthcare professional. [place] [address] any warranty or liability for your use of this information. Dehydration: Care Instructions Your Care Instructions Dehydration happens when your body loses too much fluid. This might happen when you do not drink enough water or you lose large amounts of fluids from your body because of diarrhea, vomiting, or sweating. Severe dehydration can be life-threatening. Water and minerals called electrolytes help put your body fluids back in balance. Learn the early signs of fluid loss, and drink more fluids to prevent dehydration. Follow-up care is a key part of your treatment and safety. Be sure to make and go to all appointments, and call your doctor if you are having problems. It's also a good idea to know your test results and keep a list of the medicines you take. How can you care for yourself at home?  
· To prevent dehydration, drink plenty of fluids, enough so that your urine is light yellow or clear like water. Choose water and other caffeine-free clear liquids until you feel better. If you have kidney, heart, or liver disease and have to limit fluids, talk with your doctor before you increase the amount of fluids you drink. · If you do not feel like eating or drinking, try taking small sips of water, sports drinks, or other rehydration drinks. · Get plenty of rest. 
To prevent dehydration · Add more fluids to your diet and daily routine, unless your doctor has told you not to. · During hot weather, drink more fluids. Drink even more fluids if you exercise a lot. Stay away from drinks with alcohol or caffeine. · Watch for the symptoms of dehydration. These include: ? A dry, sticky mouth. ? Dark yellow urine, and not much of it. ? Dry and sunken eyes. ? Feeling very tired. · Learn what problems can lead to dehydration. These include: ? Diarrhea, fever, and vomiting. ? Any illness with a fever, such as pneumonia or the flu. ? Activities that cause heavy sweating, such as endurance races and heavy outdoor work in hot or humid weather. ? Alcohol or drug abuse or withdrawal. 
? Certain medicines, such as cold and allergy pills (antihistamines), diet pills (diuretics), and laxatives. ? Certain diseases, such as diabetes, cancer, and heart or kidney disease. When should you call for help? Call 911 anytime you think you may need emergency care. For example, call if: 
  · You passed out (lost consciousness).  
 Call your doctor now or seek immediate medical care if: 
  · You are confused and cannot think clearly.  
  · You are dizzy or lightheaded, or you feel like you may faint.  
  · You have signs of needing more fluids. You have sunken eyes and a dry mouth, and you pass only a little dark urine.  
  · You cannot keep fluids down.  
 Watch closely for changes in your health, and be sure to contact your doctor if: 
  · You are not making tears.   · Your skin is very dry and sags slowly back into place after you pinch it.  
  · Your mouth and eyes are very dry. Where can you learn more? Go to http://gemma-humberto.info/. Enter Q366 in the search box to learn more about \"Dehydration: Care Instructions. \" Current as of: September 23, 2018 Content Version: 11.9 © 3118-2953 [employer]. Care instructions adapted under license by [employer] (which disclaims liability or warranty for this information). If you have questions about a medical condition or this instruction, always ask your healthcare professional. [place] [address] any warranty or liability for your use of this information.

## 2019-05-21 LAB
CYTOLOGIST CVX/VAG CYTO: ABNORMAL
CYTOLOGY CVX/VAG DOC CYTO: ABNORMAL
DX ICD CODE: ABNORMAL
DX ICD CODE: ABNORMAL
LABCORP, 190119: ABNORMAL
Lab: ABNORMAL
OTHER STN SPEC: ABNORMAL
PATHOLOGIST CVX/VAG CYTO: ABNORMAL
STAT OF ADQ CVX/VAG CYTO-IMP: ABNORMAL

## 2019-06-10 ENCOUNTER — OFFICE VISIT (OUTPATIENT)
Dept: INTERNAL MEDICINE CLINIC | Age: 24
End: 2019-06-10

## 2019-06-10 VITALS
TEMPERATURE: 98.5 F | WEIGHT: 205 LBS | HEIGHT: 66 IN | OXYGEN SATURATION: 98 % | HEART RATE: 103 BPM | BODY MASS INDEX: 32.95 KG/M2 | DIASTOLIC BLOOD PRESSURE: 89 MMHG | SYSTOLIC BLOOD PRESSURE: 134 MMHG | RESPIRATION RATE: 18 BRPM

## 2019-06-10 DIAGNOSIS — I10 ESSENTIAL HYPERTENSION, BENIGN: Primary | ICD-10-CM

## 2019-06-10 DIAGNOSIS — Z79.899 ENCOUNTER FOR LONG-TERM (CURRENT) USE OF OTHER MEDICATIONS: ICD-10-CM

## 2019-06-10 DIAGNOSIS — D06.9 CARCINOMA IN SITU OF CERVIX, UNSPECIFIED LOCATION: ICD-10-CM

## 2019-06-10 NOTE — PROGRESS NOTES
Chief Complaint   Patient presents with    Hypertension     follow up      1. Have you been to the ER, urgent care clinic since your last visit? Hospitalized since your last visit? No    2. Have you seen or consulted any other health care providers outside of the [address] [place] [address] since your last visit? Include any pap smears or colon screening.  No

## 2019-06-10 NOTE — PROGRESS NOTES
SPORTS MEDICINE AND PRIMARY CARE  Anya Christianson MD  [address] [place] [address]    Chief Complaint   Patient presents with    Hypertension     follow up        SUBJECTIVE:    Jakob Rosenberg is a 30 y.o. female for HTN follow up. Tolerating amlodipine well and compliant with therapy. Aware of GYN appointment for OSMANY III. Current Outpatient Medications   Medication Sig Dispense Refill    amLODIPine (NORVASC) 5 mg tablet Take 1 Tab by mouth daily. Take for blood pressure 90 Tab 0       0     Past Medical History:   Diagnosis Date    Congenital heart defect     Hypertension      History reviewed. No pertinent surgical history. No Known Allergies    REVIEW OF SYSTEMS:    Cardiovascular : negative for - chest pain, edema, palpitations or shortness of breath  Musculoskeletal: negative for -  joint swelling    Social History     Socioeconomic History    Marital status: SINGLE     Spouse name: Not on file    Number of children: Not on file    Years of education: Not on file    Highest education level: Not on file   Tobacco Use    Smoking status: Former Smoker     Last attempt to quit: 11/16/2015     Years since quitting: 3.6    Smokeless tobacco: Former User   Substance and Sexual Activity    Alcohol use: No    Drug use: No    Sexual activity: Yes     Partners: Male   Other Topics Concern     Service No    Sleep Concern No    Weight Concern No    Seat Belt Yes     Family History   Problem Relation Age of Onset    No Known Problems Mother     No Known Problems Father     No Known Problems Sister     No Known Problems Brother     Diabetes Maternal Grandmother        OBJECTIVE:     Visit Vitals  /89   Pulse (!) 103   Temp 98.5 °F (36.9 °C) (Oral)   Resp 18   Ht 5' 6\" (1.676 m)   Wt 205 lb (93 kg)   LMP 04/26/2019 (Within Days)   SpO2 98%   Breastfeeding? Unknown   BMI 33.09 kg/m²     CONSTITUTIONAL: well developed, obese, no distress  NECK: supple.   RESPIRATORY: Chest: clear bilaterally  CARDIOVASCULAR: Heart: regular rate and rhythm  MUSCULOSKELETAL: Extremities: no edema, pulse 1+     ASSESSMENT:   HTN controlled  OSMANY 3- scheduled for LEEP    PLAN:  Coninue amlodipine 5 mg every day  RTO 3 mo  . I have discussed the diagnosis with the patient and the intended plan as seen in the  orders above. The patient understands and agees with the plan. The patient has   received an after visit summary and questions were answered concerning  future plans  Patient labs and/or xrays were reviewed  Past records were reviewed. Follow-up and Dispositions    · Return in about 3 months (around 9/10/2019) for blood pressure follow up. ATTENTION:   This medical record was transcribed using an electronic medical records system. Although proofread, it may and can contain electronic and spelling errors. Other human spelling and other errors may be present. Corrections may be executed at a later time. Please feel free to contact us for any clarifications as needed. s-p leep  Home hr [phone]

## 2019-06-10 NOTE — PATIENT INSTRUCTIONS
Loop Electrosurgical Excision Procedure (LEEP): What to Expect at [facility] Your Recovery You may have mild cramping for several hours after the procedure. A dark brown vaginal discharge during the first week is normal. You can use a sanitary pad for the bleeding. You may also have some spotting for about 3 weeks. How long it takes to recover will depend on how much was done during the procedure. This care sheet gives you a general idea about how long it will take for you to recover. But each person recovers at a different pace. Follow the steps below to feel better as quickly as possible. How can you care for yourself at home? Activity 
  · You should be able to go back to your normal activities in 1 to 3 days. Medicines 
  · Your doctor will tell you if and when you can restart your medicines. He or she will also give you instructions about taking any new medicines.  
  · If you take blood thinners, such as warfarin (Coumadin), clopidogrel (Plavix), or aspirin, be sure to talk to your doctor. He or she will tell you if and when to start taking those medicines again. Make sure that you understand exactly what your doctor wants you to do.  
  · Take an over-the-counter pain medicine, such as acetaminophen (Tylenol), ibuprofen (Advil, Motrin), or naproxen (Aleve). Read and follow all instructions on the label.  
  · Do not take two or more pain medicines at the same time unless the doctor told you to. Many pain medicines have acetaminophen, which is Tylenol. Too much acetaminophen (Tylenol) can be harmful. Exercise 
  · Do not exercise for 1 to 3 days after the procedure. Other instructions 
  · Use a sanitary pad if you have bleeding.  
  · Do not have sexual intercourse or use tampons for 3 weeks. Do not douche. This will allow your cervix to heal.  
  · You can take a shower anytime after the procedure. Ask your doctor when it is okay to take a bath.   · Be sure to have regular follow-up Pap tests. Your doctor can tell you how often you need to have Pap tests. Follow-up care is a key part of your treatment and safety. Be sure to make and go to all appointments, and call your doctor if you are having problems. It's also a good idea to know your test results and keep a list of the medicines you take. When should you call for help? Call 911 anytime you think you may need emergency care. For example, call if: 
  · You passed out (lost consciousness).  
  · You have chest pain, are short of breath, or cough up blood.  
 Call your doctor now or seek immediate medical care if: 
  · You have pain that does not get better after you take pain medicine.  
  · You cannot pass stools or gas.  
  · You have signs of infection, such as: 
? Increased pain, swelling, warmth, or redness. ? A fever.  
  · You have bright red vaginal bleeding that soaks one or more pads in an hour, or you have large clots.  
  · You have vaginal discharge that has increased in amount or smells bad.  
  · You are sick to your stomach or cannot drink fluids.  
  · You have signs of a blood clot in your leg (called a deep vein thrombosis), such as: 
? Pain in your calf, back of the knee, thigh, or groin. ? Redness or swelling in your leg.  
 Watch closely for any changes in your health, and be sure to contact your doctor if you have any problems. Where can you learn more? Go to http://gemma-humberto.info/. Enter [phone] [id number] in the search box to learn more about \"Loop Electrosurgical Excision Procedure (LEEP): What to Expect at Home. \" Current as of: March 27, 2018 Content Version: 11.9 © 2726-2796 [employer]. Care instructions adapted under license by [employer] (which disclaims liability or warranty for this information).  If you have questions about a medical condition or this instruction, always ask your healthcare professional. Cari Nieto[employer] disclaims any warranty or liability for your use of this information.

## 2019-06-18 ENCOUNTER — OFFICE VISIT (OUTPATIENT)
Dept: INTERNAL MEDICINE CLINIC | Age: 24
End: 2019-06-18

## 2019-06-18 VITALS
BODY MASS INDEX: 30.7 KG/M2 | HEIGHT: 66 IN | SYSTOLIC BLOOD PRESSURE: 138 MMHG | WEIGHT: 191 LBS | TEMPERATURE: 97.1 F | OXYGEN SATURATION: 100 % | RESPIRATION RATE: 18 BRPM | DIASTOLIC BLOOD PRESSURE: 91 MMHG | HEART RATE: 98 BPM

## 2019-06-18 DIAGNOSIS — B35.3 TINEA PEDIS OF LEFT FOOT: Primary | ICD-10-CM

## 2019-06-18 DIAGNOSIS — R87.613 HGSIL (HIGH GRADE SQUAMOUS INTRAEPITHELIAL LESION) ON PAP SMEAR OF CERVIX: ICD-10-CM

## 2019-06-18 RX ORDER — FLUCONAZOLE 150 MG/1
TABLET ORAL
Qty: 3 TAB | Refills: 0 | Status: SHIPPED | OUTPATIENT
Start: 2019-06-18 | End: 2019-07-09 | Stop reason: SDUPTHER

## 2019-06-18 NOTE — PROGRESS NOTES
SPORTS MEDICINE AND PRIMARY CARE  Satnam Christianson MD  [address] [place] [id number]  Chief Complaint   Patient presents with    Toe Pain       SUBJECTIVE:    Debbie [place] is a 30 y.o. female  With pain between toes for several days. No trauma history. Had LEEP since last visit for HGSIL. Current Outpatient Medications   Medication Sig Dispense Refill           amLODIPine (NORVASC) 5 mg tablet Take 1 Tab by mouth daily. Take for blood pressure 90 Tab 0     Past Medical History:   Diagnosis Date    Congenital heart defect     Hypertension      History reviewed. No pertinent surgical history. No Known Allergies    REVIEW OF SYSTEMS:  General: negative for - chills or fever  Musculoskeletal: negative for - gait disturbance, joint pain, joint stiffness or joint swelling  Neurological: no numbness or tingling  Hematologic: no bruises, no bleeding,   Integument: no lumps,  nail changes or rash    OBJECTIVE:     Visit Vitals  BP (!) 138/91   Pulse 98   Temp 97.1 °F (36.2 °C) (Oral)   Resp 18   Ht 5' 6\" (1.676 m)   Wt 191 lb (86.6 kg)   LMP 06/10/2019   SpO2 100%   BMI 30.83 kg/m²     CONSTITUTIONAL: no acute distress  RESPIRATORY: Chest: clear bilaterally  CARDIOVASCULAR: Heart: regular rate and rhythm  MUSCULOSKELETAL: Extremities: no edema, pulse intact   INTEGUMENT: moist white skin between toes 4 and 5 on left foot; nails appear normal.      ASSESSMENT:   1. Tinea pedis of left foot      I have discussed the diagnosis with the patient and the intended plan as seen in the  orders above. The patient understands and agees with the plan. The patient has   received an after visit summary and questions were answered concerning  future plans  Patient labs and/or xrays were reviewed  Past records were reviewed. PLAN:  .  Orders Placed This Encounter    fluconazole (DIFLUCAN) 150 mg tablet  Q week x 2-3 weeks       Follow-up and Dispositions    · Return in about 3 weeks (around 7/9/2019).

## 2019-06-18 NOTE — PATIENT INSTRUCTIONS
Athlete's Foot: Care Instructions  Your Care Instructions    Athlete's foot is an itchy rash on the foot caused by an infection with a fungus. You can get it by going barefoot in wet public areas, such as swimming pools or locker rooms. Many times there is no clear reason why you get athlete's foot. You can easily treat athlete's foot by putting medicine on your feet for 1 to 6 weeks. In some cases, a doctor may prescribe pills to kill the fungus. Follow-up care is a key part of your treatment and safety. Be sure to make and go to all appointments, and call your doctor if you are having problems. It's also a good idea to know your test results and keep a list of the medicines you take. How can you care for yourself at home? · Your doctor may suggest an over-the counter lotion or spray or may prescribe a medicine. Take your medicines exactly as prescribed. Call your doctor if you think you are having a problem with your medicine. · Keep your feet clean and dry. · When you get dressed, put your socks on before your underwear. This can prevent the fungus from spreading from your feet to your groin. To prevent athlete's foot  · Wear flip-flops or other shower sandals in public locker rooms and showers and by the pool. · Dry between your toes after swimming or bathing. · Wear leather shoes or sandals, which let air get to your feet. · Change your socks as needed so your feet stay as dry as possible. · Use antifungal powder on your feet. When should you call for help? Watch closely for changes in your health, and be sure to contact your doctor if:    · You do not get better as expected. Where can you learn more? Go to http://gemma-humberto.info/. Enter M498 in the search box to learn more about \"Athlete's Foot: Care Instructions. \"  Current as of: April 17, 2018  Content Version: 11.9  © 6968-1214 [email or web address].  Care instructions adapted under license by [employer] (which disclaims liability or warranty for this information). If you have questions about a medical condition or this instruction, always ask your healthcare professional. [address] any warranty or liability for your use of this information. Fluconazole (Diflucan) - (By mouth)   Why this medicine is used:   Prevents and treats fungal infections. Contact a nurse or doctor right away if you have:  · Blistering, peeling, red skin rash  · Fast, pounding, or uneven heartbeat; lightheadedness or fainting  · Dark urine or pale stools, vomiting, loss of appetite  · Yellow skin or eyes     Common side effects:  · Mild nausea, vomiting, stomach pain, diarrhea  · Headache  © 2017 [facility] Information is for End User's use only and may not be sold, redistributed or otherwise used for commercial purposes.

## 2019-06-18 NOTE — PROGRESS NOTES
Chief Complaint   Patient presents with    Toe Pain     Pt presents for let foot pinky toe and ball of foot pain x 2 weeks. Pt reports swelling and painful when walking. 1. Have you been to the ER, urgent care clinic since your last visit? Hospitalized since your last visit? No     2. Have you seen or consulted any other health care providers outside of the [address] [place] [address] since your last visit? Include any pap smears or colon screening.  No

## 2019-07-09 ENCOUNTER — OFFICE VISIT (OUTPATIENT)
Dept: INTERNAL MEDICINE CLINIC | Age: 24
End: 2019-07-09

## 2019-07-09 VITALS
WEIGHT: 199.4 LBS | TEMPERATURE: 97.9 F | OXYGEN SATURATION: 96 % | RESPIRATION RATE: 20 BRPM | SYSTOLIC BLOOD PRESSURE: 125 MMHG | HEIGHT: 66 IN | DIASTOLIC BLOOD PRESSURE: 81 MMHG | BODY MASS INDEX: 32.05 KG/M2 | HEART RATE: 95 BPM

## 2019-07-09 DIAGNOSIS — I10 ESSENTIAL HYPERTENSION, BENIGN: Primary | ICD-10-CM

## 2019-07-09 DIAGNOSIS — B35.3 TINEA PEDIS OF LEFT FOOT: ICD-10-CM

## 2019-07-09 DIAGNOSIS — Z79.899 ENCOUNTER FOR LONG-TERM (CURRENT) USE OF OTHER MEDICATIONS: ICD-10-CM

## 2019-07-09 RX ORDER — PRENATAL VIT 91/IRON/FOLIC/DHA 28-975-200
COMBINATION PACKAGE (EA) ORAL 2 TIMES DAILY
Qty: 30 G | Refills: 0 | Status: SHIPPED | OUTPATIENT
Start: 2019-07-09 | End: 2020-12-17

## 2019-07-09 RX ORDER — FLUCONAZOLE 150 MG/1
TABLET ORAL
Qty: 3 TAB | Refills: 0 | Status: SHIPPED | OUTPATIENT
Start: 2019-07-09 | End: 2020-12-17

## 2019-07-09 NOTE — PROGRESS NOTES
1. Have you been to the ER, urgent care clinic since your last visit? Hospitalized since your last visit? No    2. Have you seen or consulted any other health care providers outside of the [address] [place] [address] since your last visit? Include any pap smears or colon screening.  No     Wants to follow up on left foot issue

## 2019-07-09 NOTE — PROGRESS NOTES
SPORTS MEDICINE AND PRIMARY CARE  Ranulfo Christianson MD  [address] [address]    Chief Complaint   Patient presents with    Foot Pain     follow up       SUBJECTIVE:    Inga Esparza is a 30 y.o. female following up tinea pedis treated with difucan. She used the medication and pain between toes has ceased. Tolerating norvasc for HTN without side effects. LMP 7-3-19. Contraception: none. Sexually activce : no. Current Outpatient Medications   Medication Sig Dispense Refill    fluconazole (DIFLUCAN) 150 mg tablet FDA advises cautious prescribing of oral fluconazole in pregnancy. Take 1 po each week for up to 3 weeks 3 Tab 0       0    amLODIPine (NORVASC) 5 mg tablet Take 1 Tab by mouth daily. Take for blood pressure 90 Tab 0     Past Medical History:   Diagnosis Date    Congenital heart defect     Hypertension      History reviewed. No pertinent surgical history. No Known Allergies    REVIEW OF SYSTEMS:  Per HPI    OBJECTIVE:     Visit Vitals  /81   Pulse 95   Temp 97.9 °F (36.6 °C) (Oral)   Resp 20   Ht 5' 6\" (1.676 m)   Wt 199 lb 6.4 oz (90.4 kg)   LMP 07/09/2019   SpO2 96%   BMI 32.18 kg/m²     CONSTITUTIONAL: no acute distress  INTEGUMENT: decreased white moist skin interdigital space 4, left foot  MENTAL STATUS: alert and oriented, appropriate affect     ASSESSMENT:   1. Tinea pedis of left foot , some improvement   2       HTN controlled    I have discussed the diagnosis with the patient and the intended plan as seen in the  orders above. The patient understands and agees with the plan. The patient has   received an after visit summary and questions were answered concerning  future plans  Patient labs and/or xrays were reviewed  Past records were reviewed.     PLAN:  .  Orders Placed This Encounter    fluconazole (DIFLUCAN) 150 mg tablet, continue 1 po weekly for up to 3 more weeks    terbinafine HCl (LAMISIL) 1 % topical cream to affected skin qd        Continue norvasc RTO 3 mo

## 2019-07-09 NOTE — PATIENT INSTRUCTIONS
Terbinafine (On the skin) Terbinafine (TER-bin-a-feen) Treats a fungus infection on your skin, including tinea pedis (\"athlete's foot\"), tinea corporis (\"ringworm\"), and tinea cruris (\"jock itch\"). Brand Name(s): [facility] Terbinafine Hydrochloride, [facility] Terbinafine HCl, LamISIL AT, LamISIL AT Athlete's Foot, LamISIL AT Cream For Jock Itch, [facility] Athlete's Foot AF, [facility] Athlete's Foot, [facility] Antifungal, [facility] Athlete's Foot Cream  
There may be other brand names for this medicine. When This Medicine Should Not Be Used: You should not use this medicine if you have had an allergic reaction to terbinafine. How to Use This Medicine:  
Cream, Spray, Liquid · Your doctor will tell you how much of this medicine to apply and how often. Do not use more medicine or apply it more often than your doctor tells you to. · Follow the instructions on the medicine label if you are using this medicine without a prescription. · Use this medicine only on your skin. Rinse it off right away if it gets on a cut or scrape. Do not get the medicine in your eyes, nose, or mouth. · This medicine should not be used in the vagina. · Before using this medicine, use soap and water to wash the skin where you will use the medicine. Dry your skin with a clean towel. · Put a thin layer of the cream over the infected area and rub it in gently. · Use enough liquid to cover the infected skin and the healthy skin around the infection. · Do not use the spray on your face. · Use the medicine on both feet if you have athlete's foot. · Do not cover the treated area with a bandage unless directed by your doctor. · Take all of the medicine in your prescription to clear up your infection, even if you feel better after the first few doses. · Wash your hands with soap and water before and after you use this medicine. If a dose is missed: · Use your medicine as soon as possible unless it is almost time for your next dose. · Skip the missed dose if it is almost time for your next regular dose. · You should not use two doses at the same time. How to Store and Dispose of This Medicine:  
· Keep the medicine at room temperature, away from heat and moisture. Do not freeze or refrigerate. · Keep all medicine out of the reach of children. Never share your medicine with anyone. Drugs and Foods to Avoid: Ask your doctor or pharmacist before using any other medicine, including over-the-counter medicines, vitamins, and herbal products. · You should not use other medicines on the same area of skin unless your doctor tells you to. Warnings While Using This Medicine: · If you are pregnant or breastfeeding, talk to your doctor before using this medicine. · Call your doctor if your symptoms do not improve or if they get worse. · This medicine will only treat certain kinds of infections. Use the medicine as your doctor ordered. You should not use the medicine to treat other conditions such as diaper rash or vaginal infections. Possible Side Effects While Using This Medicine:  
Call your doctor right away if you notice any of these side effects: 
· Itching, rash, swelling, blisters, or redness that were not there before you used this medicine If you notice these less serious side effects, talk with your doctor: · Stinging, burning, or dryness where medicine is put on If you notice other side effects that you think are caused by this medicine, tell your doctor. Call your doctor for medical advice about side effects. You may report side effects to FDA at 8-195-FDA-6858 © 2017 [address] Information is for End User's use only and may not be sold, redistributed or otherwise used for commercial purposes. The above information is an  only.  It is not intended as medical advice for individual conditions or treatments. Talk to your doctor, nurse or pharmacist before following any medical regimen to see if it is safe and effective for you. Athlete's Foot: Care Instructions Your Care Instructions Athlete's foot is an itchy rash on the foot caused by an infection with a fungus. You can get it by going barefoot in wet public areas, such as swimming pools or locker rooms. Many times there is no clear reason why you get athlete's foot. You can easily treat athlete's foot by putting medicine on your feet for 1 to 6 weeks. In some cases, a doctor may prescribe pills to kill the fungus. Follow-up care is a key part of your treatment and safety. Be sure to make and go to all appointments, and call your doctor if you are having problems. It's also a good idea to know your test results and keep a list of the medicines you take. How can you care for yourself at home? · Your doctor may suggest an over-the counter lotion or spray or may prescribe a medicine. Take your medicines exactly as prescribed. Call your doctor if you think you are having a problem with your medicine. · Keep your feet clean and dry. · When you get dressed, put your socks on before your underwear. This can prevent the fungus from spreading from your feet to your groin. To prevent athlete's foot · Wear flip-flops or other shower sandals in public locker rooms and showers and by the pool. · Dry between your toes after swimming or bathing. · Wear leather shoes or sandals, which let air get to your feet. · Change your socks as needed so your feet stay as dry as possible. · Use antifungal powder on your feet. When should you call for help? Watch closely for changes in your health, and be sure to contact your doctor if: 
  · You do not get better as expected. Where can you learn more? Go to http://gemma-humberto.info/. Enter M498 in the search box to learn more about \"Athlete's Foot: Care Instructions. \" Current as of: April 17, 2018 Content Version: 11.9 © 0249-7699 [employer]. Care instructions adapted under license by [employer] (which disclaims liability or warranty for this information). If you have questions about a medical condition or this instruction, always ask your healthcare professional. [place] [address] any warranty or liability for your use of this information.

## 2019-09-10 ENCOUNTER — OFFICE VISIT (OUTPATIENT)
Dept: INTERNAL MEDICINE CLINIC | Age: 24
End: 2019-09-10

## 2019-09-10 VITALS
OXYGEN SATURATION: 98 % | HEART RATE: 87 BPM | RESPIRATION RATE: 16 BRPM | DIASTOLIC BLOOD PRESSURE: 89 MMHG | SYSTOLIC BLOOD PRESSURE: 125 MMHG | HEIGHT: 66 IN | TEMPERATURE: 98.7 F | BODY MASS INDEX: 32.95 KG/M2 | WEIGHT: 205 LBS

## 2019-09-10 DIAGNOSIS — Z79.899 ENCOUNTER FOR LONG-TERM (CURRENT) USE OF OTHER MEDICATIONS: ICD-10-CM

## 2019-09-10 DIAGNOSIS — I10 ESSENTIAL HYPERTENSION, BENIGN: Primary | ICD-10-CM

## 2019-09-10 RX ORDER — AMLODIPINE BESYLATE 5 MG/1
5 TABLET ORAL DAILY
Qty: 90 TAB | Refills: 1 | Status: SHIPPED | OUTPATIENT
Start: 2019-09-10 | End: 2020-05-18 | Stop reason: SDUPTHER

## 2019-09-10 NOTE — PROGRESS NOTES
Chief Complaint   Patient presents with    Hypertension     Patient is here for a hypertention follow up. Per patient she needs a refill on her medication. 1. Have you been to the ER, urgent care clinic since your last visit? Hospitalized since your last visit? No    2. Have you seen or consulted any other health care providers outside of the [address] [place] [address] since your last visit? Include any pap smears or colon screening.  No

## 2019-09-10 NOTE — PROGRESS NOTES
SPORTS MEDICINE AND PRIMARY CARE  Hi Andrade. MD Meghan  [address] [address]      Chief Complaint   Patient presents with    Hypertension     Patient is here for a hypertention follow up. Per patient she needs a refill on her medication. SUBJECTIVE:    Niru Stoll is a 30 y.o. female who presents for follow up of hypertension and obesity. Diet and Lifestyle: generally follows a low fat low cholesterol diet, generally follows a low sodium diet, sedentary, nonsmoker  Home BP Monitoring: is well controlled at home, ranging 120's/80's    Cardiovascular ROS: taking medications as instructed, no medication side effects noted, no TIA's, no chest pain on exertion, no dyspnea on exertion, no swelling of ankles, no orthostatic dizziness or lightheadedness, no orthopnea or paroxysmal nocturnal dyspnea, no palpitations. New concerns: none. LMP: 9/2/19  Contraception: abstinence    Current Outpatient Medications   Medication Sig Dispense Refill    amLODIPine (NORVASC) 5 mg tablet Take 1 Tab by mouth daily. Take for blood pressure 90 Tab 1                   Past Medical History:   Diagnosis Date    Congenital heart defect     Hypertension      History reviewed. No pertinent surgical history.   No Known Allergies    REVIEW OF SYSTEMS:  Per hpi    Social History     Socioeconomic History    Marital status: SINGLE     Spouse name: Not on file    Number of children: 2    Years of education: HSG    Highest education level: Not on file   Tobacco Use    Smoking status: Former Smoker     Last attempt to quit: 11/16/2015     Years since quitting: 3.8    Smokeless tobacco: Former User   Substance and Sexual Activity    Alcohol use: No    Drug use: No    Sexual activity: Not Currently     Partners: Male   Other Topics Concern     Service No    Sleep Concern No    Weight Concern Yes    Seat Belt Yes       OBJECTIVE:     Visit Vitals  /89   Pulse 87   Temp 98.7 °F (37.1 °C)   Resp 16   Ht 5' 6\" (1.676 m)   Wt 205 lb (93 kg)   SpO2 98%   BMI 33.09 kg/m²     Appearance: alert, well appearing, and in no distress. General exam: CVS exam BP noted to be well controlled today in office, S1, S2 normal, no gallop, no murmur, chest clear, no JVD, no HSM, no edema, peripheral vascular exam both carotids normal upstroke without bruits, neurological exam alert, oriented, normal speech, no focal findings or movement disorder noted. Lab review: labs are reviewed, up to date and normal, labs reviewed, I note that lipids HDL low. ASSESSMENT:   1. Essential hypertension, benign -controlled       I have discussed the diagnosis with the patient and the intended plan as seen in the  orders. The patient understands and agees with the plan. The patient has   received an after visit summary and questions were answered concerning  future plans  Patient labs and/or xrays were reviewed  Past records were reviewed. PLAN:    Orders Placed This Encounter    amLODIPine (NORVASC) 5 mg tablet #90 rx1       Follow-up and Dispositions    · Return in about 3 months (around 12/10/2019) for blood pressure follow up.        Medication Side Effects and Warnings were discussed with patient,  Patient Labs were reviewed and or requested, and  Patient Past Records were reviewed and or requested     Counseled regarding diet, exercise and healthy lifestyle

## 2019-09-20 PROBLEM — Z12.4 CERVICAL CANCER SCREENING: Status: RESOLVED | Noted: 2019-05-13 | Resolved: 2019-09-20

## 2019-12-18 ENCOUNTER — OFFICE VISIT (OUTPATIENT)
Dept: INTERNAL MEDICINE CLINIC | Age: 24
End: 2019-12-18

## 2019-12-18 VITALS
RESPIRATION RATE: 18 BRPM | BODY MASS INDEX: 35.6 KG/M2 | WEIGHT: 221.5 LBS | HEART RATE: 90 BPM | OXYGEN SATURATION: 98 % | SYSTOLIC BLOOD PRESSURE: 124 MMHG | DIASTOLIC BLOOD PRESSURE: 88 MMHG | HEIGHT: 66 IN | TEMPERATURE: 98.4 F

## 2019-12-18 DIAGNOSIS — I10 ESSENTIAL HYPERTENSION, BENIGN: Primary | ICD-10-CM

## 2019-12-18 DIAGNOSIS — Z79.899 ENCOUNTER FOR LONG-TERM (CURRENT) USE OF OTHER MEDICATIONS: ICD-10-CM

## 2019-12-18 DIAGNOSIS — E66.01 SEVERE OBESITY (HCC): ICD-10-CM

## 2019-12-18 NOTE — PROGRESS NOTES
Chief Complaint   Patient presents with    Hypertension     follow up      1. Have you been to the ER, urgent care clinic since your last visit? Hospitalized since your last visit? No    2. Have you seen or consulted any other health care providers outside of the [address] [place] [address] since your last visit? Include any pap smears or colon screening.  No

## 2019-12-18 NOTE — PROGRESS NOTES
SPORTS MEDICINE AND PRIMARY CARE  Izzy Christianson MD  [address] [address]    Chief Complaint   Patient presents with    Hypertension     follow up        SUBJECTIVE:    Anup Ruvalcaba is a 30 y.o. female who presents for follow up of hypertension. Diet and Lifestyle: not attempting to follow a low fat, low cholesterol diet, not attempting to follow a low sodium diet, sedentary, nonsmoker  Home BP Monitoring: is not measured at home  Cardiovascular ROS: taking medications as instructed, no medication side effects noted, no TIA's, no chest pain on exertion, no dyspnea on exertion, no swelling of ankles. New concerns: none. LMP 19  Declines flu vaccination  Eats a lot of fried foods  Craves sweet tea      Current Outpatient Medications   Medication Sig Dispense Refill    amLODIPine (NORVASC) 5 mg tablet Take 1 Tab by mouth daily. Take for blood pressure 90 Tab 1                   Past Medical History:   Diagnosis Date    Congenital heart defect     Hypertension      History reviewed. No pertinent surgical history.   No Known Allergies    REVIEW OF SYSTEMS:  Per hpi      Social History     Socioeconomic History    Marital status: SINGLE     Spouse name: Not on file    Number of children: Not on file    Years of education: Not on file    Highest education level: Not on file   Tobacco Use    Smoking status: Former Smoker     Last attempt to quit: 2015     Years since quittin.0    Smokeless tobacco: Former User   Substance and Sexual Activity    Alcohol use: No    Drug use: No    Sexual activity: Not Currently     Partners: Male   Other Topics Concern     Service No    Sleep Concern No    Weight Concern No    Seat Belt Yes     Family History   Problem Relation Age of Onset    No Known Problems Mother     No Known Problems Father     No Known Problems Sister     No Known Problems Brother     Diabetes Maternal Grandmother        OBJECTIVE:     Visit Vitals  /88   Pulse 90   Temp 98.4 °F (36.9 °C) (Oral)   Resp 18   Ht 5' 6\" (1.676 m)   Wt 221 lb 8 oz (100.5 kg)   SpO2 98%   BMI 35.75 kg/m²     Appearance: alert, well appearing, and in no distress. General exam: CVS exam BP noted to be well controlled today in office, S1, S2 normal, no gallop, no murmur, chest clear, no JVD, no HSM, no edema, peripheral vascular exam both carotids normal upstroke without bruits, radial pulses normal, aorta not palpable, neurological exam alert, oriented, normal speech, no focal findings or movement disorder noted. ASSESSMENT:   1. Severe obesity ([address]    2. htn controlled    I have discussed the diagnosis with the patient and the intended plan as seen in the  orders above. The patient understands and agees with the plan. The patient has   received an after visit summary and questions were answered concerning  future plans  Patient labs and/or xrays were reviewed  Past records were reviewed.     PLAN:  Continue amlodipine, low sodium diet, 30 min exercise daily, weight reduction  RTO 3 mo

## 2020-06-17 ENCOUNTER — OFFICE VISIT (OUTPATIENT)
Dept: INTERNAL MEDICINE CLINIC | Age: 25
End: 2020-06-17

## 2020-06-17 DIAGNOSIS — I10 ESSENTIAL HYPERTENSION, BENIGN: Primary | ICD-10-CM

## 2020-06-17 DIAGNOSIS — Z79.899 ENCOUNTER FOR LONG-TERM (CURRENT) USE OF OTHER MEDICATIONS: ICD-10-CM

## 2020-06-17 NOTE — PROGRESS NOTES
SPORTS MEDICINE AND PRIMARY CARE  Valeri Hines. MD Meghan  [address] [place] [address]      Chief Complaint   Patient presents with    Hypertension     Patient Is here for a follow up. SUBJECTIVE:      Subjective:     Rin Arias is a 30 y.o. female who presents for follow up of hypertension. Diet and Lifestyle: not attempting to follow a low fat, low cholesterol diet, not attempting to follow a low sodium diet, exercises sporadically, nonsmoker, alcohol intake -none  Home BP Monitoring: is well controlled at home, ranging 120's/89    Cardiovascular ROS: taking medications as instructed, no medication side effects noted, no TIA's, no chest pain on exertion, no dyspnea on exertion, no swelling of ankles, no orthostatic dizziness or lightheadedness, no palpitations. LMP last week. Not using contraception. Not sexually active. Patient Active Problem List   Diagnosis Code    Overweight (BMI 25.0-29. 9) [id number]    Pregnancy Z34.90    Sinus tachycardia R00.0    Functional diarrhea K59.1    Vaginosis N76.0    Other headache syndrome G44.89    Perforated ear drum, left H72.92    Non-intractable cyclical vomiting with nausea R11.15    Severe obesity (HCC) E66.01     Current Outpatient Medications   Medication Sig Dispense Refill    amLODIPine (NORVASC) 5 mg tablet Take 1 Tab by mouth daily. Take for blood pressure 90 Tab 0    fluconazole (DIFLUCAN) 150 mg tablet FDA advises cautious prescribing of oral fluconazole in pregnancy. Take 1 po each week for up to 3 weeks 3 Tab 0    terbinafine HCl (LAMISIL) 1 % topical cream Apply  to affected area two (2) times a day. 30 g 0     No Known Allergies  Past Medical History:   Diagnosis Date    Congenital heart defect     Hypertension      No past surgical history on file.   Family History   Problem Relation Age of Onset    No Known Problems Mother     No Known Problems Father     No Known Problems Sister     No Known Problems Brother    Lizzy Panchito Diabetes Maternal Grandmother      Social History     Tobacco Use    Smoking status: Former Smoker     Last attempt to quit: 2015     Years since quittin.5    Smokeless tobacco: Former User   Substance Use Topics    Alcohol use: No        Objective:     Visit Vitals  /89   Pulse 98   Temp 98.6 °F (37 °C)   Resp 16   Ht 5' 6\" (1.676 m)   Wt 221 lb 4.8 oz (100.4 kg)   SpO2 100%   BMI 35.72 kg/m²     Appearance: alert, well appearing, and in no distress. General exam: CVS exam BP noted to be well controlled today in office, S1, S2 normal, no gallop, no murmur, chest clear, no JVD, no HSM, no edema,  peripheral vascular exam radial and pedal pulses normal.  Lab review: HDL cholesterol is low. ASSESSMENT:   1. Essential hypertension, benign -controlled   2. Encounter for long-term (current) use of other medications        I have discussed the diagnosis with the patient and the intended plan as seen in the  orders. The patient understands and agees with the plan. The patient has   received an after visit summary and questions were answered concerning  future plans  Patient labs and/or xrays were reviewed  Past records were reviewed. PLAN:  .  Orders Placed This Encounter    CBC WITH AUTOMATED DIFF    METABOLIC PANEL, COMPREHENSIVE    HCG QL SERUM    URINALYSIS W/ RFLX MICROSCOPIC   Continue chronic medications. Patient will return for lab work to the office wait. Follow-up and Dispositions    · Return in about 6 months (around 2020) for annual wellness exam.       Moreno Guillory M.D. This note was created using voice recognition software.   Edits have been made but syntax errors might exist.

## 2020-06-17 NOTE — PROGRESS NOTES
Chief Complaint   Patient presents with    Hypertension     Patient Is here for a follow up. 1. Have you been to the ER, urgent care clinic since your last visit? Hospitalized since your last visit? No    2. Have you seen or consulted any other health care providers outside of the [address] [place] [address] since your last visit? Include any pap smears or colon screening.  No

## 2020-06-21 VITALS
BODY MASS INDEX: 35.57 KG/M2 | TEMPERATURE: 98.6 F | OXYGEN SATURATION: 100 % | HEIGHT: 66 IN | HEART RATE: 98 BPM | RESPIRATION RATE: 16 BRPM | SYSTOLIC BLOOD PRESSURE: 132 MMHG | DIASTOLIC BLOOD PRESSURE: 89 MMHG | WEIGHT: 221.3 LBS

## 2020-08-14 DIAGNOSIS — I10 ESSENTIAL HYPERTENSION, BENIGN: ICD-10-CM

## 2020-08-18 RX ORDER — AMLODIPINE BESYLATE 5 MG/1
5 TABLET ORAL DAILY
Qty: 90 TAB | Refills: 0 | Status: SHIPPED | OUTPATIENT
Start: 2020-08-18 | End: 2020-12-30 | Stop reason: SDUPTHER

## 2020-10-05 ENCOUNTER — CLINICAL SUPPORT (OUTPATIENT)
Dept: INTERNAL MEDICINE CLINIC | Age: 25
End: 2020-10-05

## 2020-10-05 DIAGNOSIS — I10 ESSENTIAL HYPERTENSION, BENIGN: Primary | ICD-10-CM

## 2020-10-06 LAB
ALBUMIN SERPL-MCNC: 4.3 G/DL (ref 3.9–5)
ALBUMIN/GLOB SERPL: 1.5 {RATIO} (ref 1.2–2.2)
ALP SERPL-CCNC: 77 IU/L (ref 39–117)
ALT SERPL-CCNC: 15 IU/L (ref 0–32)
APPEARANCE UR: CLEAR
AST SERPL-CCNC: 15 IU/L (ref 0–40)
B-HCG SERPL QL: NEGATIVE MIU/ML
BACTERIA #/AREA URNS HPF: ABNORMAL /[HPF]
BASOPHILS # BLD AUTO: 0 X10E3/UL (ref 0–0.2)
BASOPHILS NFR BLD AUTO: 0 %
BILIRUB SERPL-MCNC: 0.3 MG/DL (ref 0–1.2)
BILIRUB UR QL STRIP: NEGATIVE
BUN SERPL-MCNC: 12 MG/DL (ref 6–20)
BUN/CREAT SERPL: 11 (ref 9–23)
CALCIUM SERPL-MCNC: 9.4 MG/DL (ref 8.7–10.2)
CASTS URNS QL MICRO: ABNORMAL /LPF
CHLORIDE SERPL-SCNC: 101 MMOL/L (ref 96–106)
CO2 SERPL-SCNC: 24 MMOL/L (ref 20–29)
COLOR UR: YELLOW
CREAT SERPL-MCNC: 1.07 MG/DL (ref 0.57–1)
EOSINOPHIL # BLD AUTO: 0.2 X10E3/UL (ref 0–0.4)
EOSINOPHIL NFR BLD AUTO: 2 %
EPI CELLS #/AREA URNS HPF: >10 /HPF (ref 0–10)
ERYTHROCYTE [DISTWIDTH] IN BLOOD BY AUTOMATED COUNT: 13.3 % (ref 11.7–15.4)
GLOBULIN SER CALC-MCNC: 2.8 G/DL (ref 1.5–4.5)
GLUCOSE SERPL-MCNC: 103 MG/DL (ref 65–99)
GLUCOSE UR QL: NEGATIVE
HCT VFR BLD AUTO: 41.9 % (ref 34–46.6)
HGB BLD-MCNC: 13.4 G/DL (ref 11.1–15.9)
HGB UR QL STRIP: NEGATIVE
IMM GRANULOCYTES # BLD AUTO: 0 X10E3/UL (ref 0–0.1)
IMM GRANULOCYTES NFR BLD AUTO: 0 %
KETONES UR QL STRIP: NEGATIVE
LEUKOCYTE ESTERASE UR QL STRIP: ABNORMAL
LYMPHOCYTES # BLD AUTO: 2.3 X10E3/UL (ref 0.7–3.1)
LYMPHOCYTES NFR BLD AUTO: 29 %
MCH RBC QN AUTO: 30.3 PG (ref 26.6–33)
MCHC RBC AUTO-ENTMCNC: 32 G/DL (ref 31.5–35.7)
MCV RBC AUTO: 95 FL (ref 79–97)
MICRO URNS: ABNORMAL
MONOCYTES # BLD AUTO: 0.6 X10E3/UL (ref 0.1–0.9)
MONOCYTES NFR BLD AUTO: 7 %
MUCOUS THREADS URNS QL MICRO: PRESENT
NEUTROPHILS # BLD AUTO: 4.9 X10E3/UL (ref 1.4–7)
NEUTROPHILS NFR BLD AUTO: 62 %
NITRITE UR QL STRIP: NEGATIVE
PH UR STRIP: 6 [PH] (ref 5–7.5)
PLATELET # BLD AUTO: 301 X10E3/UL (ref 150–450)
POTASSIUM SERPL-SCNC: 5 MMOL/L (ref 3.5–5.2)
PROT SERPL-MCNC: 7.1 G/DL (ref 6–8.5)
PROT UR QL STRIP: ABNORMAL
RBC # BLD AUTO: 4.42 X10E6/UL (ref 3.77–5.28)
RBC #/AREA URNS HPF: ABNORMAL /HPF (ref 0–2)
SODIUM SERPL-SCNC: 141 MMOL/L (ref 134–144)
SP GR UR: >=1.03 (ref 1–1.03)
UROBILINOGEN UR STRIP-MCNC: 1 MG/DL (ref 0.2–1)
WBC # BLD AUTO: 8 X10E3/UL (ref 3.4–10.8)
WBC #/AREA URNS HPF: ABNORMAL /HPF (ref 0–5)

## 2020-12-17 ENCOUNTER — OFFICE VISIT (OUTPATIENT)
Dept: INTERNAL MEDICINE CLINIC | Age: 25
End: 2020-12-17
Payer: MEDICAID

## 2020-12-17 VITALS
HEART RATE: 93 BPM | BODY MASS INDEX: 36.32 KG/M2 | HEIGHT: 66 IN | OXYGEN SATURATION: 97 % | SYSTOLIC BLOOD PRESSURE: 131 MMHG | DIASTOLIC BLOOD PRESSURE: 86 MMHG | RESPIRATION RATE: 16 BRPM | WEIGHT: 226 LBS | TEMPERATURE: 98.3 F

## 2020-12-17 DIAGNOSIS — R79.89 ELEVATED SERUM CREATININE: Primary | ICD-10-CM

## 2020-12-17 DIAGNOSIS — I10 ESSENTIAL HYPERTENSION, BENIGN: ICD-10-CM

## 2020-12-17 DIAGNOSIS — Z23 ENCOUNTER FOR IMMUNIZATION: ICD-10-CM

## 2020-12-17 DIAGNOSIS — Z79.899 ENCOUNTER FOR LONG-TERM (CURRENT) USE OF OTHER MEDICATIONS: ICD-10-CM

## 2020-12-17 PROCEDURE — 90686 IIV4 VACC NO PRSV 0.5 ML IM: CPT | Performed by: FAMILY MEDICINE

## 2020-12-17 PROCEDURE — 90471 IMMUNIZATION ADMIN: CPT | Performed by: FAMILY MEDICINE

## 2020-12-17 PROCEDURE — 99214 OFFICE O/P EST MOD 30 MIN: CPT | Performed by: FAMILY MEDICINE

## 2020-12-17 NOTE — PROGRESS NOTES
SPORTS MEDICINE AND PRIMARY CARE  Zara Mark. MD Meghan  [address] [address]    Chief Complaint   Patient presents with    Hypertension     Patient is here for a hypertention follow up. SUBJECTIVE:    Ayanna Rick is a 30 y.o. female who presents for follow up of hypertension and low HDL choelsterol. LMP    Diet and Lifestyle: not attempting to follow a low fat, low cholesterol diet, not attempting to follow a low sodium diet, exercises regularly, nonsmoker, alcohol intake -none  Home BP Monitoring: is well controlled at home    Cardiovascular ROS: taking medications as instructed, no medication side effects noted, no TIA's, no chest pain on exertion, no dyspnea on exertion, no swelling of ankles. Current Outpatient Medications   Medication Sig Dispense Refill    amLODIPine (NORVASC) 5 mg tablet Take 1 Tab by mouth daily. Take for blood pressure 90 Tab 0    cefUROXime (CEFTIN) 500 mg tablet Take 1 Tab by mouth two (2) times a day. Indication: infection 14 Tab 0     Past Medical History:   Diagnosis Date    Congenital heart defect     Hypertension      History reviewed. No pertinent surgical history.   No Known Allergies    Social History     Socioeconomic History    Marital status: SINGLE     Spouse name: Not on file    Number of children: Not on file    Years of education: Not on file    Highest education level: Not on file   Tobacco Use    Smoking status: Former Smoker     Quit date: 2015     Years since quittin.1    Smokeless tobacco: Former User   Substance and Sexual Activity    Alcohol use: No    Drug use: No    Sexual activity: Not Currently     Partners: Male   Other Topics Concern     Service No    Sleep Concern No    Weight Concern No    Seat Belt Yes     Family History   Problem Relation Age of Onset    No Known Problems Mother     No Known Problems Father     No Known Problems Sister     No Known Problems Brother     Diabetes Maternal Grandmother        OBJECTIVE:     Visit Vitals  /86   Pulse 93   Temp 98.3 °F (36.8 °C)   Resp 16   Ht 5' 6\" (1.676 m)   Wt 226 lb (102.5 kg)   SpO2 97%   BMI 36.48 kg/m²     Appearance: alert, well appearing, and in no distress. General exam: CVS exam BP noted to be well controlled today in office, S1, S2 normal, no gallop, no murmur, chest clear, no JVD, no HSM, no edema, peripheral vascular exam both carotids normal upstroke without bruits, radial pulses normal, aorta not palpable, neurological exam alert, oriented, normal speech, no focal findings or movement disorder noted. ASSESSMENT:   1. Elevated serum creatinine -consider prerenal and renal   2. Essential hypertension, benign -controlled on antihypertensive medication   3. Encounter for long-term (current) use of other medications    4. BMI 36.0-36.9,adult        I have discussed the diagnosis with the patient and the intended plan as seen in the  orders. The patient understands and agrees with the plan. The patient has   received an after visit summary and questions were answered concerning  future plans  Patient labs and/or xrays were reviewed  Past records were reviewed. PLAN:  .  Orders Placed This Encounter    URINALYSIS W/ RFLX MICROSCOPIC    PROTEIN/CREATININE RATIO, URINE ([facility] Only)    MICROSCOPIC EXAMINATION    PROT+CREATU ([facility]ANDOM)   Had a rather lengthy discussion with patient and provided written information on creatinine elevation. Follow-up and Dispositions    · Return [facility] for blood work. Counseled regarding diet, exercise and healthy lifestyle          Advised patient to call back or return to office if symptoms worsen/change/persist.       Medication risks/benefits/costs/interactions/alternatives discussed with patient    John Ochoa M.D. A total of at least 25 min was spent during this evaluation of which half was spent in counseling and care coordination.     This note was created using voice recognition software.   Edits have been made but syntax errors might exist.

## 2020-12-17 NOTE — PROGRESS NOTES
Chief Complaint   Patient presents with    Hypertension     Patient is here for a hypertention follow up. 1. Have you been to the ER, urgent care clinic since your last visit? Hospitalized since your last visit? No    2. Have you seen or consulted any other health care providers outside of the [address] [place] [address] since your last visit? Include any pap smears or colon screening.  No

## 2020-12-17 NOTE — PATIENT INSTRUCTIONS
Albumin-Creatinine Ratio: About This Test 
What is it? An albumin-creatinine ratio test compares the amounts of albumin and creatinine in your urine. Albumin (say \"al-BYOO-ki\") is normally found in the blood. When the kidneys are damaged, small amounts of albumin (microalbumin) leak into the urine. Creatinine (say \"sayz-XI-zr-neen\") is a waste product found in urine. Why is this test done? This test helps your doctor see how well your kidneys are working. It is done most often to check the kidneys in people with diabetes. It may also be done to check people with high blood pressure, heart failure, and cirrhosis. How do you prepare for the test? 
· Do not exercise just before the test. 
· Tell your doctor if you are having your period or have vaginal discharge. · Your doctor or the lab likely will give you the container you need to hold the urine. You will get instructions on when and how to collect the urine. This might be a one-time sample or a number of samples over a period of time. How to do the test 
1. Wash your hands before you collect the urine. 2. Prepare the container. If the container has a lid, remove the lid and set it down with the inner surface up. 3. Clean the area around your penis or vagina. 4. Start to urinate into the toilet or urinal. 
5. Collect the urine in the container. After the urine has flowed for several seconds, place the collection container in the stream. Collect about 2 ounces (a quarter cup) of this \"midstream\" urine without stopping the flow. Don't touch the rim of the container to your genital area. 6. Finish urinating. 7. Replace the lid on the container. 8. Wash your hands. How to do the test 
You collect your urine for a period of time, such as over 4 or 24 hours. Your doctor will give you a large container that holds about 1 gallon. You will use the container to collect your urine. · When you first get up, you empty your bladder. But don't save this urine. Write down the time you began. · For the set period of time, collect all your urine. Each time you urinate during this time period, collect your urine in a small, clean container. Then pour the urine into the large container. Don't touch the inside of either container with your fingers. · Don't get toilet paper, pubic hair, stool (feces), menstrual blood, or anything else in the urine sample. · Keep the collected urine in the refrigerator for the collection time. · Empty your bladder for the last time at or just before the end of the collection period. Add this urine to the large container. Then write down the time. What happens after the test? 
· Follow your doctor's instructions for taking the urine to the doctor's office or lab. · You can go back to your usual activities right away. Follow-up care is a key part of your treatment and safety. Be sure to make and go to all appointments, and call your doctor if you are having problems. It's also a good idea to keep a list of the medicines you take. Ask your doctor when you can expect to have your test results. Where can you learn more? Go to http://www.gray.com/ Enter P234 in the search box to learn more about \"Albumin-Creatinine Ratio: About This Test.\" Current as of: April 15, 2020               Content Version: 12.6 © 3555-7483 [employer]. Care instructions adapted under license by [employer] (which disclaims liability or warranty for this information). If you have questions about a medical condition or this instruction, always ask your healthcare professional. [place] [address] any warranty or liability for your use of this information. Creatinine and Creatinine Clearance Tests: About These Tests What are they? Creatinine tests measure the level of the waste product creatinine (say \"zqes-IW-xz-neen\") in your blood and urine. These tests show how well your kidneys are working. When the kidneys are not working well, they can't filter creatinine from the blood. So the level of creatinine in the blood goes up. The creatinine clearance (a test that measures how well your kidneys remove creatinine) goes down. Why are these tests done? A blood creatinine level or a creatinine clearance test is done to: 
· See if your kidneys are working normally or if a medicine is affecting your kidneys. · See if your kidney disease is staying the same or getting better or worse. How do you prepare for these tests? You may be asked to: · Not do any strenuous exercise for 2 days (48 hours) before having the tests. · Not eat more than 8 ounces of meat, especially beef, or other protein for 24 hours before the blood creatinine test and during the creatinine clearance urine test. 
· Drink plenty of fluids if you are asked to collect your urine for 24 hours. But don't drink coffee or tea. These are diuretics that cause your body to pass more urine than normal. 
If you are asked to collect urine, your doctor will give you a large container that holds about 1 gallon. You will use the container to collect your urine for 24 hours. Tell your doctor ALL the medicines, vitamins, supplements, and herbal remedies you take. Some may affect the results of the test. Your doctor will tell you if you should stop taking any of them before the test and how soon to do it. How are the tests done? A health professional uses a needle to take a blood sample, usually from the arm. How to do the test 
You collect your urine for a period of time, such as over 4 or 24 hours. Your doctor will give you a large container that holds about 1 gallon. You will use the container to collect your urine. · When you first get up, you empty your bladder. But don't save this urine. Write down the time you began. · For the set period of time, collect all your urine. Each time you urinate during this time period, collect your urine in a small, clean container. Then pour the urine into the large container. Don't touch the inside of either container with your fingers. · Don't get toilet paper, pubic hair, stool (feces), menstrual blood, or anything else in the urine sample. · Keep the collected urine in the refrigerator for the collection time. · Empty your bladder for the last time at or just before the end of the collection period. Add this urine to the large container. Then write down the time. How long do the tests take? The urine test will take 24 hours. The blood test will take a few minutes. What happens after the tests? · You will probably be able to go home right away. · You can go back to your usual activities right away. Follow-up care is a key part of your treatment and safety. Be sure to make and go to all appointments, and call your doctor if you are having problems. It's also a good idea to keep a list of the medicines you take. Ask your doctor when you can expect to have your test results. Where can you learn more? Go to http://www.gray.com/ Enter D909 in the search box to learn more about \"Creatinine and Creatinine Clearance Tests: About These Tests. \" Current as of: April 15, 2020               Content Version: 12.6 © 7927-9928 [employer]. Care instructions adapted under license by [employer] (which disclaims liability or warranty for this information). If you have questions about a medical condition or this instruction, always ask your healthcare professional. [place] [address] any warranty or liability for your use of this information.

## 2020-12-18 DIAGNOSIS — N30.00 ACUTE CYSTITIS WITHOUT HEMATURIA: Primary | ICD-10-CM

## 2020-12-18 LAB
APPEARANCE UR: ABNORMAL
BACTERIA #/AREA URNS HPF: ABNORMAL /[HPF]
BILIRUB UR QL STRIP: NEGATIVE
CASTS URNS QL MICRO: ABNORMAL /LPF
COLOR UR: ABNORMAL
CREAT UR-MCNC: 313.7 MG/DL
EPI CELLS #/AREA URNS HPF: >10 /HPF (ref 0–10)
GLUCOSE UR QL: ABNORMAL
HGB UR QL STRIP: NEGATIVE
KETONES UR QL STRIP: NEGATIVE
LEUKOCYTE ESTERASE UR QL STRIP: ABNORMAL
MICRO URNS: ABNORMAL
MUCOUS THREADS URNS QL MICRO: PRESENT
NITRITE UR QL STRIP: POSITIVE
PH UR STRIP: >=9 [PH] (ref 5–7.5)
PROT UR QL STRIP: ABNORMAL
PROT UR-MCNC: 34.7 MG/DL
PROT/CREAT UR: 111 MG/G CREAT (ref 0–200)
RBC #/AREA URNS HPF: ABNORMAL /HPF (ref 0–2)
SP GR UR: >=1.03 (ref 1–1.03)
UROBILINOGEN UR STRIP-MCNC: 1 MG/DL (ref 0.2–1)
WBC #/AREA URNS HPF: ABNORMAL /HPF (ref 0–5)

## 2020-12-18 RX ORDER — CEFUROXIME AXETIL 500 MG/1
500 TABLET ORAL 2 TIMES DAILY
Qty: 14 TAB | Refills: 0 | Status: SHIPPED | OUTPATIENT
Start: 2020-12-18

## 2020-12-30 DIAGNOSIS — I10 ESSENTIAL HYPERTENSION, BENIGN: ICD-10-CM

## 2020-12-30 RX ORDER — AMLODIPINE BESYLATE 5 MG/1
5 TABLET ORAL DAILY
Qty: 90 TAB | Refills: 1 | Status: SHIPPED | OUTPATIENT
Start: 2020-12-30

## 2021-01-05 ENCOUNTER — CLINICAL SUPPORT (OUTPATIENT)
Dept: INTERNAL MEDICINE CLINIC | Age: 26
End: 2021-01-05

## 2021-01-05 DIAGNOSIS — I10 ESSENTIAL HYPERTENSION, BENIGN: Primary | ICD-10-CM

## 2021-01-14 ENCOUNTER — OFFICE VISIT (OUTPATIENT)
Dept: URGENT CARE | Age: 26
End: 2021-01-14
Payer: MEDICAID

## 2021-01-14 VITALS — TEMPERATURE: 99.3 F | HEART RATE: 74 BPM | RESPIRATION RATE: 19 BRPM | OXYGEN SATURATION: 98 %

## 2021-01-14 DIAGNOSIS — Z20.822 ENCOUNTER FOR SCREENING LABORATORY TESTING FOR COVID-19 VIRUS IN ASYMPTOMATIC PATIENT: Primary | ICD-10-CM

## 2021-01-14 PROCEDURE — 99203 OFFICE O/P NEW LOW 30 MIN: CPT | Performed by: FAMILY MEDICINE

## 2021-01-14 NOTE — PROGRESS NOTES
This patient was seen at [address] [place] [address] [facility]gent [facility] while in their vehicle due to COVID-19 pandemic with PPE and focused examination in order to decrease community viral transmission. The patient/guardian gave verbal consent to treat. Rin Arias is a 30 y.o. female who presents for COVID-19 testing. Was possibly exposed to COVID-19 through family member. Denies cough, fever, SOB. The history is provided by the patient. Past Medical History:   Diagnosis Date    Congenital heart defect     Hypertension         History reviewed. No pertinent surgical history.       Family History   Problem Relation Age of Onset    No Known Problems Mother     No Known Problems Father     No Known Problems Sister     No Known Problems Brother     Diabetes Maternal Grandmother         Social History     Socioeconomic History    Marital status: SINGLE     Spouse name: Not on file    Number of children: Not on file    Years of education: Not on file    Highest education level: Not on file   Occupational History    Not on file   Social Needs    Financial resource strain: Not on file    Food insecurity     Worry: Not on file     Inability: Not on file    Transportation needs     Medical: Not on file     Non-medical: Not on file   Tobacco Use    Smoking status: Former Smoker     Quit date: 2015     Years since quittin.1    Smokeless tobacco: Former User   Substance and Sexual Activity    Alcohol use: No    Drug use: No    Sexual activity: Not Currently     Partners: Male   Lifestyle    Physical activity     Days per week: Not on file     Minutes per session: Not on file    Stress: Not on file   Relationships    Social connections     Talks on phone: Not on file     Gets together: Not on file     Attends Congregational service: Not on file     Active member of club or organization: Not on file     Attends meetings of clubs or organizations: Not on file     Relationship status: Not on file  Intimate partner violence     Fear of current or ex partner: Not on file     Emotionally abused: Not on file     Physically abused: Not on file     Forced sexual activity: Not on file   Other Topics Concern     Service No    Blood Transfusions Not Asked    Caffeine Concern Not Asked    Occupational Exposure Not Asked    Hobby Hazards Not Asked    Sleep Concern No    Stress Concern Not Asked    Weight Concern No    Special Diet Not Asked    Back Care Not Asked    Exercise Not Asked    Bike Helmet Not Asked    Seat Belt Yes    Self-Exams Not Asked   Social History Narrative    Not on file                ALLERGIES: Patient has no known allergies. Review of Systems   Constitutional: Negative for activity change, appetite change, chills and fever. HENT: Negative for congestion, rhinorrhea and sore throat. Respiratory: Negative for cough, shortness of breath and wheezing. Cardiovascular: Negative for chest pain. Gastrointestinal: Negative for abdominal pain, diarrhea, nausea and vomiting. Musculoskeletal: Negative for myalgias. Neurological: Negative for headaches. Vitals:    01/14/21 1510   Pulse: 74   Resp: 19   Temp: 99.3 °F (37.4 °C)   SpO2: 98%       Physical Exam  Vitals signs and nursing note reviewed. Constitutional:       General: She is not in acute distress. Appearance: She is well-developed. She is not diaphoretic. Pulmonary:      Effort: Pulmonary effort is normal. No respiratory distress. Breath sounds: Normal breath sounds. No stridor. No wheezing, rhonchi or rales. Neurological:      Mental Status: She is alert. Psychiatric:         Behavior: Behavior normal.         Thought Content: Thought content normal.         Judgment: Judgment normal.         MDM    ICD-10-CM ICD-9-CM   1.  Encounter for screening laboratory testing for COVID-19 virus in asymptomatic patient  Z20.822 V01.79       Orders Placed This Encounter    NOVEL CORONAVIRUS (COVID-19)     Scheduling Instructions:      1) Due to current limited availability of the COVID-19 PCR test, tests will be prioritized and may not be completed.              2) Order only if the test result will change clinical management or necessary for a return to mission-critical employment decision.              3) Print and instruct patient to adhere to CDC home isolation program. (Link Above)              4) Set up or refer patient for a monitoring program.              5) Have patient sign up for and leverage MyChart (if not previously done). Order Specific Question:   Is this test for diagnosis or screening? Answer:   Screening     Order Specific Question:   Symptomatic for COVID-19 as defined by CDC? Answer:   No     Order Specific Question:   Hospitalized for COVID-19? Answer:   No     Order Specific Question:   Admitted to ICU for COVID-19? Answer:   No     Order Specific Question:   Employed in healthcare setting? Answer:   No     Order Specific Question:   Resident in a congregate (group) care setting? Answer:   No     Order Specific Question:   Pregnant? Answer:   No     Order Specific Question:   Previously tested for COVID-19? Answer:   No        Quarantine  Deep breathing exercises, ambulation      If signs and symptoms become worse the pt is to go to the ER.          Procedures

## 2021-01-16 LAB — SARS-COV-2, NAA: NOT DETECTED
